# Patient Record
Sex: MALE | Race: WHITE | URBAN - NONMETROPOLITAN AREA
[De-identification: names, ages, dates, MRNs, and addresses within clinical notes are randomized per-mention and may not be internally consistent; named-entity substitution may affect disease eponyms.]

---
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
## 2025-01-01 ENCOUNTER — APPOINTMENT (OUTPATIENT)
Dept: RADIOLOGY | Facility: MEDICAL CENTER | Age: 20
DRG: 870 | End: 2025-01-01
Attending: INTERNAL MEDICINE
Payer: MEDICAID

## 2025-01-01 ENCOUNTER — APPOINTMENT (OUTPATIENT)
Dept: RADIOLOGY | Facility: MEDICAL CENTER | Age: 20
End: 2025-01-01
Attending: INTERNAL MEDICINE
Payer: MEDICAID

## 2025-01-01 ENCOUNTER — HOSPITAL ENCOUNTER (INPATIENT)
Facility: MEDICAL CENTER | Age: 20
LOS: 7 days | End: 2025-03-31
Attending: INTERNAL MEDICINE | Admitting: INTERNAL MEDICINE
Payer: MEDICAID

## 2025-01-01 ENCOUNTER — APPOINTMENT (OUTPATIENT)
Dept: RADIOLOGY | Facility: MEDICAL CENTER | Age: 20
DRG: 870 | End: 2025-01-01
Attending: STUDENT IN AN ORGANIZED HEALTH CARE EDUCATION/TRAINING PROGRAM
Payer: MEDICAID

## 2025-01-01 ENCOUNTER — APPOINTMENT (OUTPATIENT)
Dept: CARDIOLOGY | Facility: MEDICAL CENTER | Age: 20
End: 2025-01-01
Attending: INTERNAL MEDICINE
Payer: MEDICAID

## 2025-01-01 VITALS
DIASTOLIC BLOOD PRESSURE: 19 MMHG | RESPIRATION RATE: 20 BRPM | HEIGHT: 62 IN | OXYGEN SATURATION: 14 % | BODY MASS INDEX: 48.56 KG/M2 | TEMPERATURE: 97 F | SYSTOLIC BLOOD PRESSURE: 42 MMHG | WEIGHT: 263.89 LBS

## 2025-01-01 DIAGNOSIS — N17.9 ACUTE RENAL FAILURE, UNSPECIFIED ACUTE RENAL FAILURE TYPE (HCC): ICD-10-CM

## 2025-01-01 LAB
ALBUMIN SERPL BCP-MCNC: 2.4 G/DL (ref 3.2–4.9)
ALBUMIN SERPL BCP-MCNC: 2.7 G/DL (ref 3.2–4.9)
ALBUMIN SERPL BCP-MCNC: 2.8 G/DL (ref 3.2–4.9)
ALBUMIN SERPL BCP-MCNC: 2.9 G/DL (ref 3.2–4.9)
ALBUMIN/GLOB SERPL: 0.5 G/DL
ALBUMIN/GLOB SERPL: 0.6 G/DL
ALBUMIN/GLOB SERPL: 0.7 G/DL
ALBUMIN/GLOB SERPL: 0.7 G/DL
ALP SERPL-CCNC: 56 U/L (ref 30–99)
ALP SERPL-CCNC: 57 U/L (ref 30–99)
ALP SERPL-CCNC: 60 U/L (ref 30–99)
ALP SERPL-CCNC: 62 U/L (ref 30–99)
ALP SERPL-CCNC: 64 U/L (ref 30–99)
ALP SERPL-CCNC: 66 U/L (ref 30–99)
ALP SERPL-CCNC: 83 U/L (ref 30–99)
ALP SERPL-CCNC: 86 U/L (ref 30–99)
ALT SERPL-CCNC: 28 U/L (ref 2–50)
ALT SERPL-CCNC: 28 U/L (ref 2–50)
ALT SERPL-CCNC: 30 U/L (ref 2–50)
ALT SERPL-CCNC: 30 U/L (ref 2–50)
ALT SERPL-CCNC: 40 U/L (ref 2–50)
ALT SERPL-CCNC: 53 U/L (ref 2–50)
ALT SERPL-CCNC: 67 U/L (ref 2–50)
ALT SERPL-CCNC: 70 U/L (ref 2–50)
AMORPHOUS CRYSTALS 1764: PRESENT /HPF
ANION GAP SERPL CALC-SCNC: 10 MMOL/L (ref 7–16)
ANION GAP SERPL CALC-SCNC: 10 MMOL/L (ref 7–16)
ANION GAP SERPL CALC-SCNC: 11 MMOL/L (ref 7–16)
ANION GAP SERPL CALC-SCNC: 12 MMOL/L (ref 7–16)
ANION GAP SERPL CALC-SCNC: 15 MMOL/L (ref 7–16)
ANION GAP SERPL CALC-SCNC: 15 MMOL/L (ref 7–16)
ANION GAP SERPL CALC-SCNC: 16 MMOL/L (ref 7–16)
ANION GAP SERPL CALC-SCNC: 9 MMOL/L (ref 7–16)
APPEARANCE UR: ABNORMAL
ARTERIAL PATENCY WRIST A: ABNORMAL
AST SERPL-CCNC: 35 U/L (ref 12–45)
AST SERPL-CCNC: 38 U/L (ref 12–45)
AST SERPL-CCNC: 38 U/L (ref 12–45)
AST SERPL-CCNC: 41 U/L (ref 12–45)
AST SERPL-CCNC: 42 U/L (ref 12–45)
AST SERPL-CCNC: 44 U/L (ref 12–45)
AST SERPL-CCNC: 45 U/L (ref 12–45)
AST SERPL-CCNC: 51 U/L (ref 12–45)
B PARAP IS1001 DNA NPH QL NAA+NON-PROBE: NOT DETECTED
B PERT.PT PRMT NPH QL NAA+NON-PROBE: NOT DETECTED
BACTERIA #/AREA URNS HPF: ABNORMAL /HPF
BACTERIA BLD CULT: NORMAL
BACTERIA SPEC RESP CULT: NORMAL
BASE EXCESS BLDA CALC-SCNC: -1 MMOL/L (ref -4–3)
BASE EXCESS BLDA CALC-SCNC: -3 MMOL/L (ref -4–3)
BASE EXCESS BLDA CALC-SCNC: -4 MMOL/L (ref -4–3)
BASE EXCESS BLDA CALC-SCNC: -6 MMOL/L (ref -4–3)
BASE EXCESS BLDA CALC-SCNC: 0 MMOL/L (ref -4–3)
BASE EXCESS BLDA CALC-SCNC: 0 MMOL/L (ref -4–3)
BASE EXCESS BLDA CALC-SCNC: 1 MMOL/L (ref -4–3)
BASE EXCESS BLDA CALC-SCNC: 2 MMOL/L (ref -4–3)
BASE EXCESS BLDA CALC-SCNC: 2 MMOL/L (ref -4–3)
BASE EXCESS BLDV CALC-SCNC: -1 MMOL/L (ref -2–3)
BASE EXCESS BLDV CALC-SCNC: -2 MMOL/L (ref -2–3)
BASE EXCESS BLDV CALC-SCNC: 0 MMOL/L (ref -2–3)
BASOPHILS # BLD AUTO: 0.1 % (ref 0–1.8)
BASOPHILS # BLD: 0.01 K/UL (ref 0–0.12)
BILIRUB SERPL-MCNC: 0.3 MG/DL (ref 0.1–1.5)
BILIRUB SERPL-MCNC: 0.3 MG/DL (ref 0.1–1.5)
BILIRUB SERPL-MCNC: 0.4 MG/DL (ref 0.1–1.5)
BILIRUB SERPL-MCNC: 0.5 MG/DL (ref 0.1–1.5)
BILIRUB SERPL-MCNC: 0.6 MG/DL (ref 0.1–1.5)
BILIRUB SERPL-MCNC: 0.6 MG/DL (ref 0.1–1.5)
BILIRUB SERPL-MCNC: 0.7 MG/DL (ref 0.1–1.5)
BILIRUB SERPL-MCNC: 0.8 MG/DL (ref 0.1–1.5)
BILIRUB UR QL STRIP.AUTO: ABNORMAL
BODY TEMPERATURE: ABNORMAL DEGREES
BODY TEMPERATURE: NORMAL DEGREES
BODY TEMPERATURE: NORMAL DEGREES
BREATHS SETTING VENT: 20
BREATHS SETTING VENT: 24
BREATHS SETTING VENT: 28
BUN SERPL-MCNC: 18 MG/DL (ref 8–22)
BUN SERPL-MCNC: 20 MG/DL (ref 8–22)
BUN SERPL-MCNC: 21 MG/DL (ref 8–22)
BUN SERPL-MCNC: 23 MG/DL (ref 8–22)
BUN SERPL-MCNC: 23 MG/DL (ref 8–22)
BUN SERPL-MCNC: 28 MG/DL (ref 8–22)
BUN SERPL-MCNC: 37 MG/DL (ref 8–22)
BUN SERPL-MCNC: 39 MG/DL (ref 8–22)
C PNEUM DNA NPH QL NAA+NON-PROBE: NOT DETECTED
CALCIUM ALBUM COR SERPL-MCNC: 10 MG/DL (ref 8.5–10.5)
CALCIUM ALBUM COR SERPL-MCNC: 10.1 MG/DL (ref 8.5–10.5)
CALCIUM ALBUM COR SERPL-MCNC: 9.5 MG/DL (ref 8.5–10.5)
CALCIUM ALBUM COR SERPL-MCNC: 9.6 MG/DL (ref 8.5–10.5)
CALCIUM ALBUM COR SERPL-MCNC: 9.7 MG/DL (ref 8.5–10.5)
CALCIUM ALBUM COR SERPL-MCNC: 9.7 MG/DL (ref 8.5–10.5)
CALCIUM SERPL-MCNC: 8.3 MG/DL (ref 8.5–10.5)
CALCIUM SERPL-MCNC: 8.4 MG/DL (ref 8.4–10.2)
CALCIUM SERPL-MCNC: 8.5 MG/DL (ref 8.4–10.2)
CALCIUM SERPL-MCNC: 8.7 MG/DL (ref 8.4–10.2)
CALCIUM SERPL-MCNC: 8.8 MG/DL (ref 8.5–10.5)
CALCIUM SERPL-MCNC: 9.1 MG/DL (ref 8.5–10.5)
CASTS URNS QL MICRO: ABNORMAL /LPF (ref 0–2)
CHLORIDE SERPL-SCNC: 104 MMOL/L (ref 96–112)
CHLORIDE SERPL-SCNC: 105 MMOL/L (ref 96–112)
CHLORIDE SERPL-SCNC: 96 MMOL/L (ref 96–112)
CHLORIDE SERPL-SCNC: 98 MMOL/L (ref 96–112)
CHLORIDE SERPL-SCNC: 99 MMOL/L (ref 96–112)
CO2 BLDA-SCNC: 23 MMOL/L (ref 32–48)
CO2 BLDA-SCNC: 23 MMOL/L (ref 32–48)
CO2 BLDA-SCNC: 25 MMOL/L (ref 32–48)
CO2 BLDA-SCNC: 25 MMOL/L (ref 32–48)
CO2 BLDA-SCNC: 26 MMOL/L (ref 32–48)
CO2 BLDA-SCNC: 27 MMOL/L (ref 32–48)
CO2 BLDA-SCNC: 28 MMOL/L (ref 32–48)
CO2 BLDA-SCNC: 29 MMOL/L (ref 32–48)
CO2 BLDA-SCNC: 29 MMOL/L (ref 32–48)
CO2 BLDV-SCNC: 25 MMOL/L (ref 20–33)
CO2 BLDV-SCNC: 25 MMOL/L (ref 20–33)
CO2 BLDV-SCNC: 28 MMOL/L (ref 20–33)
CO2 SERPL-SCNC: 18 MMOL/L (ref 20–33)
CO2 SERPL-SCNC: 20 MMOL/L (ref 20–33)
CO2 SERPL-SCNC: 22 MMOL/L (ref 20–33)
CO2 SERPL-SCNC: 23 MMOL/L (ref 20–33)
CO2 SERPL-SCNC: 25 MMOL/L (ref 20–33)
CO2 SERPL-SCNC: 27 MMOL/L (ref 20–33)
COLOR UR: ABNORMAL
CREAT SERPL-MCNC: 2.44 MG/DL (ref 0.5–1.4)
CREAT SERPL-MCNC: 4.32 MG/DL (ref 0.5–1.4)
CREAT SERPL-MCNC: 4.89 MG/DL (ref 0.5–1.4)
CREAT SERPL-MCNC: 5.5 MG/DL (ref 0.5–1.4)
CREAT SERPL-MCNC: 5.57 MG/DL (ref 0.5–1.4)
CREAT SERPL-MCNC: 5.64 MG/DL (ref 0.5–1.4)
CREAT SERPL-MCNC: 5.78 MG/DL (ref 0.5–1.4)
CREAT SERPL-MCNC: 5.86 MG/DL (ref 0.5–1.4)
CRP SERPL HS-MCNC: 38.3 MG/DL (ref 0–0.75)
DELSYS IDSYS: ABNORMAL
DELSYS IDSYS: NORMAL
DELSYS IDSYS: NORMAL
END TIDAL CARBON DIOXIDE IECO2: 32 MMHG
END TIDAL CARBON DIOXIDE IECO2: 37 MMHG
END TIDAL CARBON DIOXIDE IECO2: 38 MMHG
END TIDAL CARBON DIOXIDE IECO2: 38 MMHG
END TIDAL CARBON DIOXIDE IECO2: 40 MMHG
END TIDAL CARBON DIOXIDE IECO2: 41 MMHG
EOSINOPHIL # BLD AUTO: 0.05 K/UL (ref 0–0.51)
EOSINOPHIL NFR BLD: 0.3 % (ref 0–6.9)
EPITHELIAL CELLS 1715: ABNORMAL /HPF (ref 0–5)
ERYTHROCYTE [DISTWIDTH] IN BLOOD BY AUTOMATED COUNT: 49.4 FL (ref 35.9–50)
ERYTHROCYTE [DISTWIDTH] IN BLOOD BY AUTOMATED COUNT: 50.7 FL (ref 35.9–50)
ERYTHROCYTE [DISTWIDTH] IN BLOOD BY AUTOMATED COUNT: 51.2 FL (ref 35.9–50)
ERYTHROCYTE [DISTWIDTH] IN BLOOD BY AUTOMATED COUNT: 51.3 FL (ref 35.9–50)
ERYTHROCYTE [DISTWIDTH] IN BLOOD BY AUTOMATED COUNT: 53.2 FL (ref 35.9–50)
ERYTHROCYTE [DISTWIDTH] IN BLOOD BY AUTOMATED COUNT: 53.3 FL (ref 35.9–50)
ERYTHROCYTE [DISTWIDTH] IN BLOOD BY AUTOMATED COUNT: 53.5 FL (ref 35.9–50)
ERYTHROCYTE [DISTWIDTH] IN BLOOD BY AUTOMATED COUNT: 55.4 FL (ref 35.9–50)
FLUAV RNA NPH QL NAA+NON-PROBE: NOT DETECTED
FLUBV RNA NPH QL NAA+NON-PROBE: NOT DETECTED
FOLATE SERPL-MCNC: 23.8 NG/ML
FUNGUS SPEC CULT: NORMAL
FUNGUS SPEC FUNGUS STN: NORMAL
FUNGUS SPEC FUNGUS STN: NORMAL
GFR SERPLBLD CREATININE-BSD FMLA CKD-EPI: 13 ML/MIN/1.73 M 2
GFR SERPLBLD CREATININE-BSD FMLA CKD-EPI: 14 ML/MIN/1.73 M 2
GFR SERPLBLD CREATININE-BSD FMLA CKD-EPI: 17 ML/MIN/1.73 M 2
GFR SERPLBLD CREATININE-BSD FMLA CKD-EPI: 19 ML/MIN/1.73 M 2
GFR SERPLBLD CREATININE-BSD FMLA CKD-EPI: 38 ML/MIN/1.73 M 2
GLOBULIN SER CALC-MCNC: 3.8 G/DL (ref 1.9–3.5)
GLOBULIN SER CALC-MCNC: 3.9 G/DL (ref 1.9–3.5)
GLOBULIN SER CALC-MCNC: 4.8 G/DL (ref 1.9–3.5)
GLOBULIN SER CALC-MCNC: 5.1 G/DL (ref 1.9–3.5)
GLOBULIN SER CALC-MCNC: 5.6 G/DL (ref 1.9–3.5)
GLUCOSE BLD STRIP.AUTO-MCNC: 105 MG/DL (ref 65–99)
GLUCOSE SERPL-MCNC: 100 MG/DL (ref 65–99)
GLUCOSE SERPL-MCNC: 115 MG/DL (ref 65–99)
GLUCOSE SERPL-MCNC: 119 MG/DL (ref 65–99)
GLUCOSE SERPL-MCNC: 77 MG/DL (ref 65–99)
GLUCOSE SERPL-MCNC: 82 MG/DL (ref 65–99)
GLUCOSE SERPL-MCNC: 84 MG/DL (ref 65–99)
GLUCOSE SERPL-MCNC: 90 MG/DL (ref 65–99)
GLUCOSE SERPL-MCNC: 93 MG/DL (ref 65–99)
GLUCOSE UR STRIP.AUTO-MCNC: 100 MG/DL
GRAM STN SPEC: NORMAL
GRAM STN SPEC: NORMAL
HADV DNA NPH QL NAA+NON-PROBE: NOT DETECTED
HAV IGM SERPL QL IA: NORMAL
HBV CORE IGM SER QL: NORMAL
HBV SURFACE AB SERPL IA-ACNC: <3.5 MIU/ML (ref 0–10)
HBV SURFACE AG SER QL: NORMAL
HCO3 BLDA-SCNC: 21.2 MMOL/L (ref 21–28)
HCO3 BLDA-SCNC: 22 MMOL/L (ref 21–28)
HCO3 BLDA-SCNC: 23.9 MMOL/L (ref 21–28)
HCO3 BLDA-SCNC: 23.9 MMOL/L (ref 21–28)
HCO3 BLDA-SCNC: 24.6 MMOL/L (ref 21–28)
HCO3 BLDA-SCNC: 25.8 MMOL/L (ref 21–28)
HCO3 BLDA-SCNC: 26.7 MMOL/L (ref 21–28)
HCO3 BLDA-SCNC: 27.6 MMOL/L (ref 21–28)
HCO3 BLDA-SCNC: 27.7 MMOL/L (ref 21–28)
HCO3 BLDV-SCNC: 23.5 MMOL/L (ref 22–29)
HCO3 BLDV-SCNC: 23.6 MMOL/L (ref 22–29)
HCO3 BLDV-SCNC: 26.1 MMOL/L (ref 22–29)
HCOV 229E RNA NPH QL NAA+NON-PROBE: NOT DETECTED
HCOV HKU1 RNA NPH QL NAA+NON-PROBE: NOT DETECTED
HCOV NL63 RNA NPH QL NAA+NON-PROBE: NOT DETECTED
HCOV OC43 RNA NPH QL NAA+NON-PROBE: NOT DETECTED
HCT VFR BLD AUTO: 27.4 % (ref 42–52)
HCT VFR BLD AUTO: 28.6 % (ref 42–52)
HCT VFR BLD AUTO: 29.1 % (ref 42–52)
HCT VFR BLD AUTO: 29.2 % (ref 42–52)
HCT VFR BLD AUTO: 29.5 % (ref 42–52)
HCT VFR BLD AUTO: 30.1 % (ref 42–52)
HCT VFR BLD AUTO: 33.8 % (ref 42–52)
HCT VFR BLD AUTO: 35 % (ref 42–52)
HCV AB SER QL: NORMAL
HGB BLD-MCNC: 10 G/DL (ref 14–18)
HGB BLD-MCNC: 10.3 G/DL (ref 14–18)
HGB BLD-MCNC: 11.1 G/DL (ref 14–18)
HGB BLD-MCNC: 11.3 G/DL (ref 14–18)
HGB BLD-MCNC: 9.3 G/DL (ref 14–18)
HGB BLD-MCNC: 9.5 G/DL (ref 14–18)
HGB BLD-MCNC: 9.5 G/DL (ref 14–18)
HGB BLD-MCNC: 9.9 G/DL (ref 14–18)
HMPV RNA NPH QL NAA+NON-PROBE: NOT DETECTED
HOROWITZ INDEX BLDA+IHG-RTO: 124 MM[HG]
HOROWITZ INDEX BLDA+IHG-RTO: 127 MM[HG]
HOROWITZ INDEX BLDA+IHG-RTO: 57 MM[HG]
HOROWITZ INDEX BLDA+IHG-RTO: 64 MM[HG]
HOROWITZ INDEX BLDA+IHG-RTO: 74 MM[HG]
HOROWITZ INDEX BLDA+IHG-RTO: 75 MM[HG]
HOROWITZ INDEX BLDA+IHG-RTO: 79 MM[HG]
HOROWITZ INDEX BLDA+IHG-RTO: 80 MM[HG]
HOROWITZ INDEX BLDA+IHG-RTO: 87 MM[HG]
HOROWITZ INDEX BLDV+IHG-RTO: 51 MM[HG]
HOROWITZ INDEX BLDV+IHG-RTO: 62 MM[HG]
HOROWITZ INDEX BLDV+IHG-RTO: 64 MM[HG]
HPIV1 RNA NPH QL NAA+NON-PROBE: NOT DETECTED
HPIV2 RNA NPH QL NAA+NON-PROBE: NOT DETECTED
HPIV3 RNA NPH QL NAA+NON-PROBE: DETECTED
HPIV4 RNA NPH QL NAA+NON-PROBE: NOT DETECTED
HYALINE CAST   1831: PRESENT /LPF
IMM GRANULOCYTES # BLD AUTO: 0.21 K/UL (ref 0–0.11)
IMM GRANULOCYTES NFR BLD AUTO: 1.4 % (ref 0–0.9)
KETONES UR STRIP.AUTO-MCNC: ABNORMAL MG/DL
LACTATE BLD-SCNC: 0.8 MMOL/L (ref 0.5–2)
LACTATE BLD-SCNC: 0.9 MMOL/L (ref 0.5–2)
LACTATE BLD-SCNC: 1 MMOL/L (ref 0.5–2)
LACTATE BLD-SCNC: 1.3 MMOL/L (ref 0.5–2)
LACTATE BLD-SCNC: 1.4 MMOL/L (ref 0.5–2)
LACTATE BLD-SCNC: 1.5 MMOL/L (ref 0.5–2)
LACTATE BLD-SCNC: 1.5 MMOL/L (ref 0.5–2)
LACTATE BLD-SCNC: 1.6 MMOL/L (ref 0.5–2)
LACTATE BLD-SCNC: 1.9 MMOL/L (ref 0.5–2)
LACTATE BLD-SCNC: 2 MMOL/L (ref 0.5–2)
LACTATE SERPL-SCNC: 1.2 MMOL/L (ref 0.5–2)
LACTATE SERPL-SCNC: 1.9 MMOL/L (ref 0.5–2)
LEUKOCYTE ESTERASE UR QL STRIP.AUTO: ABNORMAL
LV EJECT FRACT MOD 2C 99903: 70.43
LV EJECT FRACT MOD 4C 99902: 67.92
LV EJECT FRACT MOD BP 99901: 68.13
LYMPHOCYTES # BLD AUTO: 0.97 K/UL (ref 1–4.8)
LYMPHOCYTES NFR BLD: 6.7 % (ref 22–41)
M PNEUMO DNA NPH QL NAA+NON-PROBE: DETECTED
MAGNESIUM SERPL-MCNC: 1.6 MG/DL (ref 1.5–2.5)
MAGNESIUM SERPL-MCNC: 1.8 MG/DL (ref 1.5–2.5)
MAGNESIUM SERPL-MCNC: 1.9 MG/DL (ref 1.5–2.5)
MAGNESIUM SERPL-MCNC: 2 MG/DL (ref 1.5–2.5)
MAGNESIUM SERPL-MCNC: 2 MG/DL (ref 1.5–2.5)
MCH RBC QN AUTO: 33.7 PG (ref 27–33)
MCH RBC QN AUTO: 34 PG (ref 27–33)
MCH RBC QN AUTO: 34.4 PG (ref 27–33)
MCH RBC QN AUTO: 34.6 PG (ref 27–33)
MCH RBC QN AUTO: 35.1 PG (ref 27–33)
MCH RBC QN AUTO: 35.5 PG (ref 27–33)
MCH RBC QN AUTO: 36.1 PG (ref 27–33)
MCH RBC QN AUTO: 37.1 PG (ref 27–33)
MCHC RBC AUTO-ENTMCNC: 32.3 G/DL (ref 32.3–36.5)
MCHC RBC AUTO-ENTMCNC: 32.5 G/DL (ref 32.3–36.5)
MCHC RBC AUTO-ENTMCNC: 32.8 G/DL (ref 32.3–36.5)
MCHC RBC AUTO-ENTMCNC: 33.2 G/DL (ref 32.3–36.5)
MCHC RBC AUTO-ENTMCNC: 33.2 G/DL (ref 32.3–36.5)
MCHC RBC AUTO-ENTMCNC: 33.9 G/DL (ref 32.3–36.5)
MCHC RBC AUTO-ENTMCNC: 34 G/DL (ref 32.3–36.5)
MCHC RBC AUTO-ENTMCNC: 34.9 G/DL (ref 32.3–36.5)
MCV RBC AUTO: 101.7 FL (ref 81.4–97.8)
MCV RBC AUTO: 103.5 FL (ref 81.4–97.8)
MCV RBC AUTO: 103.7 FL (ref 81.4–97.8)
MCV RBC AUTO: 104.5 FL (ref 81.4–97.8)
MCV RBC AUTO: 105.5 FL (ref 81.4–97.8)
MCV RBC AUTO: 105.8 FL (ref 81.4–97.8)
MCV RBC AUTO: 106.7 FL (ref 81.4–97.8)
MCV RBC AUTO: 109.2 FL (ref 81.4–97.8)
MICRO URNS: ABNORMAL
MODE IMODE: ABNORMAL
MODE IMODE: NORMAL
MODE IMODE: NORMAL
MONOCYTES # BLD AUTO: 0.51 K/UL (ref 0–0.85)
MONOCYTES NFR BLD AUTO: 3.5 % (ref 0–13.4)
MUCOUS THREADS URNS QL MICRO: PRESENT /HPF
NEUTROPHILS # BLD AUTO: 12.74 K/UL (ref 1.82–7.42)
NEUTROPHILS NFR BLD: 88 % (ref 44–72)
NITRITE UR QL STRIP.AUTO: NEGATIVE
NRBC # BLD AUTO: 0 K/UL
NRBC BLD-RTO: 0 /100 WBC (ref 0–0.2)
NT-PROBNP SERPL IA-MCNC: 122 PG/ML (ref 0–125)
O2/TOTAL GAS SETTING VFR VENT: 100 %
O2/TOTAL GAS SETTING VFR VENT: 55 %
O2/TOTAL GAS SETTING VFR VENT: 60 %
O2/TOTAL GAS SETTING VFR VENT: 60 %
O2/TOTAL GAS SETTING VFR VENT: 70 %
O2/TOTAL GAS SETTING VFR VENT: 70 %
O2/TOTAL GAS SETTING VFR VENT: 80 %
O2/TOTAL GAS SETTING VFR VENT: 80 %
O2/TOTAL GAS SETTING VFR VENT: 90 %
O2/TOTAL GAS SETTING VFR VENT: 90 %
PCO2 BLDA: 37.7 MMHG (ref 32–48)
PCO2 BLDA: 38.4 MMHG (ref 32–48)
PCO2 BLDA: 38.8 MMHG (ref 32–48)
PCO2 BLDA: 44.2 MMHG (ref 32–48)
PCO2 BLDA: 45.1 MMHG (ref 32–48)
PCO2 BLDA: 48.6 MMHG (ref 32–48)
PCO2 BLDA: 49.6 MMHG (ref 32–48)
PCO2 BLDA: 50.3 MMHG (ref 32–48)
PCO2 BLDA: 53.2 MMHG (ref 32–48)
PCO2 BLDV: 37.6 MMHG (ref 38–54)
PCO2 BLDV: 42.5 MMHG (ref 38–54)
PCO2 BLDV: 46.7 MMHG (ref 38–54)
PCO2 TEMP ADJ BLDV: 38.9 MMHG (ref 38–54)
PCO2 TEMP ADJ BLDV: 43.8 MMHG (ref 38–54)
PCO2 TEMP ADJ BLDV: 49.2 MMHG (ref 38–54)
PEEP END EXPIRATORY PRESSURE IPEEP: 10 CMH20
PEEP END EXPIRATORY PRESSURE IPEEP: 12 CMH20
PEEP END EXPIRATORY PRESSURE IPEEP: 12 CMH20
PEEP END EXPIRATORY PRESSURE IPEEP: 14 CMH20
PEEP END EXPIRATORY PRESSURE IPEEP: 16 CMH20
PEEP END EXPIRATORY PRESSURE IPEEP: 16 CMH20
PH BLDA: 7.24 [PH] (ref 7.35–7.45)
PH BLDA: 7.26 [PH] (ref 7.35–7.45)
PH BLDA: 7.35 [PH] (ref 7.35–7.45)
PH BLDA: 7.36 [PH] (ref 7.35–7.45)
PH BLDA: 7.37 [PH] (ref 7.35–7.45)
PH BLDA: 7.38 [PH] (ref 7.35–7.45)
PH BLDA: 7.39 [PH] (ref 7.35–7.45)
PH BLDA: 7.4 [PH] (ref 7.35–7.45)
PH BLDA: 7.41 [PH] (ref 7.35–7.45)
PH BLDV: 7.35 [PH] (ref 7.31–7.45)
PH BLDV: 7.36 [PH] (ref 7.31–7.45)
PH BLDV: 7.41 [PH] (ref 7.31–7.45)
PH TEMP ADJ BLDA: 7.22 [PH] (ref 7.35–7.45)
PH TEMP ADJ BLDA: 7.23 [PH] (ref 7.35–7.45)
PH TEMP ADJ BLDA: 7.35 [PH] (ref 7.35–7.45)
PH TEMP ADJ BLDA: 7.35 [PH] (ref 7.35–7.45)
PH TEMP ADJ BLDA: 7.37 [PH] (ref 7.35–7.45)
PH TEMP ADJ BLDA: 7.38 [PH] (ref 7.35–7.45)
PH TEMP ADJ BLDA: 7.38 [PH] (ref 7.35–7.45)
PH TEMP ADJ BLDA: 7.39 [PH] (ref 7.35–7.45)
PH TEMP ADJ BLDA: 7.39 [PH] (ref 7.35–7.45)
PH TEMP ADJ BLDV: 7.34 [PH] (ref 7.31–7.45)
PH TEMP ADJ BLDV: 7.34 [PH] (ref 7.31–7.45)
PH TEMP ADJ BLDV: 7.39 [PH] (ref 7.31–7.45)
PH UR STRIP.AUTO: 5 [PH] (ref 5–8)
PHOSPHATE SERPL-MCNC: 3.1 MG/DL (ref 2.5–4.5)
PHOSPHATE SERPL-MCNC: 3.4 MG/DL (ref 2.5–4.5)
PHOSPHATE SERPL-MCNC: 3.5 MG/DL (ref 2.5–4.5)
PHOSPHATE SERPL-MCNC: 4.3 MG/DL (ref 2.5–4.5)
PHOSPHATE SERPL-MCNC: 4.8 MG/DL (ref 2.5–4.5)
PHOSPHATE SERPL-MCNC: 5 MG/DL (ref 2.5–4.5)
PHOSPHATE SERPL-MCNC: 5.3 MG/DL (ref 2.5–4.5)
PHOSPHATE SERPL-MCNC: 5.3 MG/DL (ref 2.5–4.5)
PLATELET # BLD AUTO: 319 K/UL (ref 164–446)
PLATELET # BLD AUTO: 334 K/UL (ref 164–446)
PLATELET # BLD AUTO: 338 K/UL (ref 164–446)
PLATELET # BLD AUTO: 363 K/UL (ref 164–446)
PLATELET # BLD AUTO: 365 K/UL (ref 164–446)
PLATELET # BLD AUTO: 366 K/UL (ref 164–446)
PLATELET # BLD AUTO: 367 K/UL (ref 164–446)
PLATELET # BLD AUTO: 387 K/UL (ref 164–446)
PMV BLD AUTO: 10 FL (ref 9–12.9)
PMV BLD AUTO: 10 FL (ref 9–12.9)
PMV BLD AUTO: 9.6 FL (ref 9–12.9)
PMV BLD AUTO: 9.7 FL (ref 9–12.9)
PMV BLD AUTO: 9.8 FL (ref 9–12.9)
PMV BLD AUTO: 9.9 FL (ref 9–12.9)
PO2 BLDA: 57 MMHG (ref 83–108)
PO2 BLDA: 58 MMHG (ref 83–108)
PO2 BLDA: 59 MMHG (ref 83–108)
PO2 BLDA: 61 MMHG (ref 83–108)
PO2 BLDA: 68 MMHG (ref 83–108)
PO2 BLDA: 72 MMHG (ref 83–108)
PO2 BLDA: 75 MMHG (ref 83–108)
PO2 BLDA: 76 MMHG (ref 83–108)
PO2 BLDA: 79 MMHG (ref 83–108)
PO2 BLDV: 36 MMHG (ref 23–48)
PO2 BLDV: 37 MMHG (ref 23–48)
PO2 BLDV: 51 MMHG (ref 23–48)
PO2 TEMP ADJ BLDA: 53 MMHG (ref 83–108)
PO2 TEMP ADJ BLDA: 58 MMHG (ref 83–108)
PO2 TEMP ADJ BLDA: 63 MMHG (ref 83–108)
PO2 TEMP ADJ BLDA: 64 MMHG (ref 83–108)
PO2 TEMP ADJ BLDA: 71 MMHG (ref 83–108)
PO2 TEMP ADJ BLDA: 77 MMHG (ref 83–108)
PO2 TEMP ADJ BLDA: 80 MMHG (ref 83–108)
PO2 TEMP ADJ BLDA: 82 MMHG (ref 83–108)
PO2 TEMP ADJ BLDA: 86 MMHG (ref 83–108)
PO2 TEMP ADJ BLDV: 39 MMHG
PO2 TEMP ADJ BLDV: 39 MMHG
PO2 TEMP ADJ BLDV: 54 MMHG
POTASSIUM SERPL-SCNC: 3.5 MMOL/L (ref 3.6–5.5)
POTASSIUM SERPL-SCNC: 3.8 MMOL/L (ref 3.6–5.5)
POTASSIUM SERPL-SCNC: 4 MMOL/L (ref 3.6–5.5)
POTASSIUM SERPL-SCNC: 4.1 MMOL/L (ref 3.6–5.5)
POTASSIUM SERPL-SCNC: 4.4 MMOL/L (ref 3.6–5.5)
POTASSIUM SERPL-SCNC: 4.6 MMOL/L (ref 3.6–5.5)
PREALB SERPL-MCNC: <3 MG/DL (ref 18–38)
PRELIMINARY RPT Q0601: NORMAL
PROT SERPL-MCNC: 6.2 G/DL (ref 6–8.2)
PROT SERPL-MCNC: 6.2 G/DL (ref 6–8.2)
PROT SERPL-MCNC: 6.3 G/DL (ref 6–8.2)
PROT SERPL-MCNC: 6.5 G/DL (ref 6–8.2)
PROT SERPL-MCNC: 6.6 G/DL (ref 6–8.2)
PROT SERPL-MCNC: 7.2 G/DL (ref 6–8.2)
PROT SERPL-MCNC: 7.5 G/DL (ref 6–8.2)
PROT SERPL-MCNC: 8.5 G/DL (ref 6–8.2)
PROT UR QL STRIP: 30 MG/DL
RBC # BLD AUTO: 2.51 M/UL (ref 4.7–6.1)
RBC # BLD AUTO: 2.71 M/UL (ref 4.7–6.1)
RBC # BLD AUTO: 2.76 M/UL (ref 4.7–6.1)
RBC # BLD AUTO: 2.82 M/UL (ref 4.7–6.1)
RBC # BLD AUTO: 2.85 M/UL (ref 4.7–6.1)
RBC # BLD AUTO: 2.86 M/UL (ref 4.7–6.1)
RBC # BLD AUTO: 3.26 M/UL (ref 4.7–6.1)
RBC # BLD AUTO: 3.35 M/UL (ref 4.7–6.1)
RBC # URNS HPF: ABNORMAL /HPF
RBC UR QL AUTO: ABNORMAL
RHODAMINE-AURAMINE STN SPEC: NORMAL
RSV RNA NPH QL NAA+NON-PROBE: NOT DETECTED
RV+EV RNA NPH QL NAA+NON-PROBE: NOT DETECTED
S PYO DNA SPEC NAA+PROBE: DETECTED
SAO2 % BLDA: 88 % (ref 93–99)
SAO2 % BLDA: 88 % (ref 93–99)
SAO2 % BLDA: 90 % (ref 93–99)
SAO2 % BLDA: 90 % (ref 93–99)
SAO2 % BLDA: 91 % (ref 93–99)
SAO2 % BLDA: 92 % (ref 93–99)
SAO2 % BLDA: 93 % (ref 93–99)
SAO2 % BLDA: 95 % (ref 93–99)
SAO2 % BLDA: 95 % (ref 93–99)
SAO2 % BLDV: 66 % (ref 60–85)
SAO2 % BLDV: 68 % (ref 60–85)
SAO2 % BLDV: 86 % (ref 60–85)
SARS-COV-2 RNA NPH QL NAA+NON-PROBE: NOTDETECTED
SCCMEC + MECA PNL NOSE NAA+PROBE: NEGATIVE
SIGNIFICANT IND 70042: NORMAL
SITE SITE: NORMAL
SODIUM SERPL-SCNC: 132 MMOL/L (ref 135–145)
SODIUM SERPL-SCNC: 133 MMOL/L (ref 135–145)
SODIUM SERPL-SCNC: 134 MMOL/L (ref 135–145)
SODIUM SERPL-SCNC: 134 MMOL/L (ref 135–145)
SODIUM SERPL-SCNC: 135 MMOL/L (ref 135–145)
SODIUM SERPL-SCNC: 135 MMOL/L (ref 135–145)
SODIUM SERPL-SCNC: 138 MMOL/L (ref 135–145)
SODIUM SERPL-SCNC: 138 MMOL/L (ref 135–145)
SOURCE SOURCE: NORMAL
SP GR UR STRIP.AUTO: 1.02
SPECIMEN DRAWN FROM PATIENT: ABNORMAL
SPECIMEN DRAWN FROM PATIENT: NORMAL
SPECIMEN DRAWN FROM PATIENT: NORMAL
TIDAL VOLUME IVT: 330 ML
TIDAL VOLUME IVT: 400 ML
TRIGL SERPL-MCNC: 108 MG/DL (ref 0–149)
TRIGL SERPL-MCNC: 170 MG/DL (ref 0–149)
TRIGL SERPL-MCNC: 171 MG/DL (ref 0–149)
TRIGL SERPL-MCNC: 239 MG/DL (ref 0–149)
TRIGL SERPL-MCNC: 265 MG/DL (ref 0–149)
UROBILINOGEN UR STRIP.AUTO-MCNC: 1 EU/DL
VIT B12 SERPL-MCNC: 468 PG/ML (ref 211–911)
WBC # BLD AUTO: 12.7 K/UL (ref 4.8–10.8)
WBC # BLD AUTO: 14.5 K/UL (ref 4.8–10.8)
WBC # BLD AUTO: 14.8 K/UL (ref 4.8–10.8)
WBC # BLD AUTO: 15.3 K/UL (ref 4.8–10.8)
WBC # BLD AUTO: 15.4 K/UL (ref 4.8–10.8)
WBC # BLD AUTO: 15.8 K/UL (ref 4.8–10.8)
WBC # BLD AUTO: 16.6 K/UL (ref 4.8–10.8)
WBC # BLD AUTO: 20.6 K/UL (ref 4.8–10.8)
WBC #/AREA URNS HPF: ABNORMAL /HPF

## 2025-01-01 PROCEDURE — 700111 HCHG RX REV CODE 636 W/ 250 OVERRIDE (IP): Performed by: INTERNAL MEDICINE

## 2025-01-01 PROCEDURE — 31624 DX BRONCHOSCOPE/LAVAGE: CPT | Performed by: INTERNAL MEDICINE

## 2025-01-01 PROCEDURE — 99291 CRITICAL CARE FIRST HOUR: CPT | Mod: 25 | Performed by: INTERNAL MEDICINE

## 2025-01-01 PROCEDURE — 71045 X-RAY EXAM CHEST 1 VIEW: CPT

## 2025-01-01 PROCEDURE — 93306 TTE W/DOPPLER COMPLETE: CPT

## 2025-01-01 PROCEDURE — 80053 COMPREHEN METABOLIC PANEL: CPT

## 2025-01-01 PROCEDURE — 94669 MECHANICAL CHEST WALL OSCILL: CPT

## 2025-01-01 PROCEDURE — 94003 VENT MGMT INPAT SUBQ DAY: CPT

## 2025-01-01 PROCEDURE — 700105 HCHG RX REV CODE 258: Performed by: INTERNAL MEDICINE

## 2025-01-01 PROCEDURE — 99232 SBSQ HOSP IP/OBS MODERATE 35: CPT | Performed by: INTERNAL MEDICINE

## 2025-01-01 PROCEDURE — 31645 BRNCHSC W/THER ASPIR 1ST: CPT | Performed by: INTERNAL MEDICINE

## 2025-01-01 PROCEDURE — A9270 NON-COVERED ITEM OR SERVICE: HCPCS | Performed by: INTERNAL MEDICINE

## 2025-01-01 PROCEDURE — 82803 BLOOD GASES ANY COMBINATION: CPT

## 2025-01-01 PROCEDURE — 87641 MR-STAPH DNA AMP PROBE: CPT

## 2025-01-01 PROCEDURE — 700101 HCHG RX REV CODE 250: Performed by: INTERNAL MEDICINE

## 2025-01-01 PROCEDURE — 83735 ASSAY OF MAGNESIUM: CPT

## 2025-01-01 PROCEDURE — 83605 ASSAY OF LACTIC ACID: CPT

## 2025-01-01 PROCEDURE — 94799 UNLISTED PULMONARY SVC/PX: CPT

## 2025-01-01 PROCEDURE — 94760 N-INVAS EAR/PLS OXIMETRY 1: CPT

## 2025-01-01 PROCEDURE — 99292 CRITICAL CARE ADDL 30 MIN: CPT | Performed by: STUDENT IN AN ORGANIZED HEALTH CARE EDUCATION/TRAINING PROGRAM

## 2025-01-01 PROCEDURE — 0BJ08ZZ INSPECTION OF TRACHEOBRONCHIAL TREE, VIA NATURAL OR ARTIFICIAL OPENING ENDOSCOPIC: ICD-10-PCS | Performed by: INTERNAL MEDICINE

## 2025-01-01 PROCEDURE — 84478 ASSAY OF TRIGLYCERIDES: CPT

## 2025-01-01 PROCEDURE — 700102 HCHG RX REV CODE 250 W/ 637 OVERRIDE(OP): Performed by: INTERNAL MEDICINE

## 2025-01-01 PROCEDURE — 83605 ASSAY OF LACTIC ACID: CPT | Mod: 91

## 2025-01-01 PROCEDURE — 36600 WITHDRAWAL OF ARTERIAL BLOOD: CPT

## 2025-01-01 PROCEDURE — 700111 HCHG RX REV CODE 636 W/ 250 OVERRIDE (IP): Mod: JZ | Performed by: INTERNAL MEDICINE

## 2025-01-01 PROCEDURE — 99255 IP/OBS CONSLTJ NEW/EST HI 80: CPT | Performed by: INTERNAL MEDICINE

## 2025-01-01 PROCEDURE — 82746 ASSAY OF FOLIC ACID SERUM: CPT

## 2025-01-01 PROCEDURE — 700111 HCHG RX REV CODE 636 W/ 250 OVERRIDE (IP)

## 2025-01-01 PROCEDURE — 99291 CRITICAL CARE FIRST HOUR: CPT | Performed by: INTERNAL MEDICINE

## 2025-01-01 PROCEDURE — 99292 CRITICAL CARE ADDL 30 MIN: CPT | Performed by: INTERNAL MEDICINE

## 2025-01-01 PROCEDURE — 97602 WOUND(S) CARE NON-SELECTIVE: CPT

## 2025-01-01 PROCEDURE — 0202U NFCT DS 22 TRGT SARS-COV-2: CPT

## 2025-01-01 PROCEDURE — 85027 COMPLETE CBC AUTOMATED: CPT

## 2025-01-01 PROCEDURE — 700111 HCHG RX REV CODE 636 W/ 250 OVERRIDE (IP): Performed by: STUDENT IN AN ORGANIZED HEALTH CARE EDUCATION/TRAINING PROGRAM

## 2025-01-01 PROCEDURE — 94640 AIRWAY INHALATION TREATMENT: CPT

## 2025-01-01 PROCEDURE — 302977 HCHG BRONCHOSCOPY PROC-THERAPEUTIC

## 2025-01-01 PROCEDURE — 87040 BLOOD CULTURE FOR BACTERIA: CPT

## 2025-01-01 PROCEDURE — 99233 SBSQ HOSP IP/OBS HIGH 50: CPT | Performed by: INTERNAL MEDICINE

## 2025-01-01 PROCEDURE — 02HV33Z INSERTION OF INFUSION DEVICE INTO SUPERIOR VENA CAVA, PERCUTANEOUS APPROACH: ICD-10-PCS | Performed by: INTERNAL MEDICINE

## 2025-01-01 PROCEDURE — 770022 HCHG ROOM/CARE - ICU (200)

## 2025-01-01 PROCEDURE — 87205 SMEAR GRAM STAIN: CPT | Mod: 91

## 2025-01-01 PROCEDURE — 84134 ASSAY OF PREALBUMIN: CPT

## 2025-01-01 PROCEDURE — 80074 ACUTE HEPATITIS PANEL: CPT

## 2025-01-01 PROCEDURE — 82607 VITAMIN B-12: CPT

## 2025-01-01 PROCEDURE — 36556 INSERT NON-TUNNEL CV CATH: CPT | Performed by: INTERNAL MEDICINE

## 2025-01-01 PROCEDURE — 82962 GLUCOSE BLOOD TEST: CPT

## 2025-01-01 PROCEDURE — 84100 ASSAY OF PHOSPHORUS: CPT

## 2025-01-01 PROCEDURE — 90935 HEMODIALYSIS ONE EVALUATION: CPT

## 2025-01-01 PROCEDURE — 36415 COLL VENOUS BLD VENIPUNCTURE: CPT

## 2025-01-01 PROCEDURE — 74176 CT ABD & PELVIS W/O CONTRAST: CPT

## 2025-01-01 PROCEDURE — 70491 CT SOFT TISSUE NECK W/DYE: CPT

## 2025-01-01 PROCEDURE — 0B9J8ZX DRAINAGE OF LEFT LOWER LUNG LOBE, VIA NATURAL OR ARTIFICIAL OPENING ENDOSCOPIC, DIAGNOSTIC: ICD-10-PCS | Performed by: INTERNAL MEDICINE

## 2025-01-01 PROCEDURE — 87102 FUNGUS ISOLATION CULTURE: CPT

## 2025-01-01 PROCEDURE — 81001 URINALYSIS AUTO W/SCOPE: CPT

## 2025-01-01 PROCEDURE — 93306 TTE W/DOPPLER COMPLETE: CPT | Mod: 26 | Performed by: INTERNAL MEDICINE

## 2025-01-01 PROCEDURE — 82803 BLOOD GASES ANY COMBINATION: CPT | Mod: 91

## 2025-01-01 PROCEDURE — 700111 HCHG RX REV CODE 636 W/ 250 OVERRIDE (IP): Mod: JZ | Performed by: STUDENT IN AN ORGANIZED HEALTH CARE EDUCATION/TRAINING PROGRAM

## 2025-01-01 PROCEDURE — 700117 HCHG RX CONTRAST REV CODE 255: Performed by: INTERNAL MEDICINE

## 2025-01-01 PROCEDURE — 86140 C-REACTIVE PROTEIN: CPT

## 2025-01-01 PROCEDURE — 99292 CRITICAL CARE ADDL 30 MIN: CPT | Mod: 25 | Performed by: STUDENT IN AN ORGANIZED HEALTH CARE EDUCATION/TRAINING PROGRAM

## 2025-01-01 PROCEDURE — 302978 HCHG BRONCHOSCOPY-DIAGNOSTIC

## 2025-01-01 PROCEDURE — 36556 INSERT NON-TUNNEL CV CATH: CPT

## 2025-01-01 PROCEDURE — 94002 VENT MGMT INPAT INIT DAY: CPT

## 2025-01-01 PROCEDURE — 86706 HEP B SURFACE ANTIBODY: CPT

## 2025-01-01 PROCEDURE — 5A1955Z RESPIRATORY VENTILATION, GREATER THAN 96 CONSECUTIVE HOURS: ICD-10-PCS | Performed by: INTERNAL MEDICINE

## 2025-01-01 PROCEDURE — 99239 HOSP IP/OBS DSCHRG MGMT >30: CPT | Mod: MISDOCU | Performed by: STUDENT IN AN ORGANIZED HEALTH CARE EDUCATION/TRAINING PROGRAM

## 2025-01-01 PROCEDURE — 87070 CULTURE OTHR SPECIMN AEROBIC: CPT

## 2025-01-01 PROCEDURE — 71275 CT ANGIOGRAPHY CHEST: CPT

## 2025-01-01 PROCEDURE — 5A1D70Z PERFORMANCE OF URINARY FILTRATION, INTERMITTENT, LESS THAN 6 HOURS PER DAY: ICD-10-PCS | Performed by: INTERNAL MEDICINE

## 2025-01-01 PROCEDURE — 87651 STREP A DNA AMP PROBE: CPT

## 2025-01-01 PROCEDURE — 74018 RADEX ABDOMEN 1 VIEW: CPT

## 2025-01-01 PROCEDURE — 37799 UNLISTED PX VASCULAR SURGERY: CPT

## 2025-01-01 PROCEDURE — 87116 MYCOBACTERIA CULTURE: CPT

## 2025-01-01 PROCEDURE — 83880 ASSAY OF NATRIURETIC PEPTIDE: CPT

## 2025-01-01 PROCEDURE — 85025 COMPLETE CBC W/AUTO DIFF WBC: CPT

## 2025-01-01 PROCEDURE — 31622 DX BRONCHOSCOPE/WASH: CPT | Performed by: INTERNAL MEDICINE

## 2025-01-01 PROCEDURE — 700105 HCHG RX REV CODE 258: Performed by: STUDENT IN AN ORGANIZED HEALTH CARE EDUCATION/TRAINING PROGRAM

## 2025-01-01 PROCEDURE — 700101 HCHG RX REV CODE 250: Performed by: STUDENT IN AN ORGANIZED HEALTH CARE EDUCATION/TRAINING PROGRAM

## 2025-01-01 PROCEDURE — 99223 1ST HOSP IP/OBS HIGH 75: CPT | Mod: 25 | Performed by: INTERNAL MEDICINE

## 2025-01-01 PROCEDURE — 99292 CRITICAL CARE ADDL 30 MIN: CPT | Mod: 25 | Performed by: INTERNAL MEDICINE

## 2025-01-01 PROCEDURE — 700101 HCHG RX REV CODE 250

## 2025-01-01 PROCEDURE — C1751 CATH, INF, PER/CENT/MIDLINE: HCPCS

## 2025-01-01 RX ORDER — ATROPINE SULFATE 10 MG/ML
2 SOLUTION/ DROPS OPHTHALMIC EVERY 4 HOURS PRN
Status: DISCONTINUED | OUTPATIENT
Start: 2025-01-01 | End: 2025-01-01 | Stop reason: HOSPADM

## 2025-01-01 RX ORDER — POLYETHYLENE GLYCOL 3350 17 G/17G
1 POWDER, FOR SOLUTION ORAL
Status: DISCONTINUED | OUTPATIENT
Start: 2025-01-01 | End: 2025-01-01

## 2025-01-01 RX ORDER — HEPARIN SODIUM 1000 [USP'U]/ML
2000 INJECTION, SOLUTION INTRAVENOUS; SUBCUTANEOUS
Status: DISCONTINUED | OUTPATIENT
Start: 2025-01-01 | End: 2025-01-01 | Stop reason: HOSPADM

## 2025-01-01 RX ORDER — ACETAMINOPHEN 650 MG/1
650 SUPPOSITORY RECTAL EVERY 4 HOURS PRN
Status: DISCONTINUED | OUTPATIENT
Start: 2025-01-01 | End: 2025-01-01 | Stop reason: HOSPADM

## 2025-01-01 RX ORDER — FAMOTIDINE 20 MG/1
20 TABLET, FILM COATED ORAL EVERY 12 HOURS
Status: DISCONTINUED | OUTPATIENT
Start: 2025-01-01 | End: 2025-01-01

## 2025-01-01 RX ORDER — VECURONIUM BROMIDE 1 MG/ML
10 INJECTION, POWDER, LYOPHILIZED, FOR SOLUTION INTRAVENOUS ONCE
Status: COMPLETED | OUTPATIENT
Start: 2025-01-01 | End: 2025-01-01

## 2025-01-01 RX ORDER — HEPARIN SODIUM 1000 [USP'U]/ML
500 INJECTION, SOLUTION INTRAVENOUS; SUBCUTANEOUS
Status: DISCONTINUED | OUTPATIENT
Start: 2025-01-01 | End: 2025-01-01 | Stop reason: HOSPADM

## 2025-01-01 RX ORDER — LINEZOLID 2 MG/ML
600 INJECTION, SOLUTION INTRAVENOUS EVERY 12 HOURS
Status: DISCONTINUED | OUTPATIENT
Start: 2025-01-01 | End: 2025-01-01

## 2025-01-01 RX ORDER — HEPARIN SODIUM 1000 [USP'U]/ML
1500 INJECTION, SOLUTION INTRAVENOUS; SUBCUTANEOUS
Status: DISCONTINUED | OUTPATIENT
Start: 2025-01-01 | End: 2025-01-01

## 2025-01-01 RX ORDER — HYDROMORPHONE HYDROCHLORIDE 1 MG/ML
2 INJECTION, SOLUTION INTRAMUSCULAR; INTRAVENOUS; SUBCUTANEOUS
Status: DISCONTINUED | OUTPATIENT
Start: 2025-01-01 | End: 2025-01-01

## 2025-01-01 RX ORDER — SODIUM CHLORIDE, SODIUM LACTATE, POTASSIUM CHLORIDE, AND CALCIUM CHLORIDE .6; .31; .03; .02 G/100ML; G/100ML; G/100ML; G/100ML
500 INJECTION, SOLUTION INTRAVENOUS ONCE
Status: COMPLETED | OUTPATIENT
Start: 2025-01-01 | End: 2025-01-01

## 2025-01-01 RX ORDER — HYDRALAZINE HYDROCHLORIDE 20 MG/ML
10 INJECTION INTRAMUSCULAR; INTRAVENOUS EVERY 4 HOURS PRN
Status: DISCONTINUED | OUTPATIENT
Start: 2025-01-01 | End: 2025-01-01 | Stop reason: HOSPADM

## 2025-01-01 RX ORDER — NOREPINEPHRINE BITARTRATE 0.03 MG/ML
0-1 INJECTION, SOLUTION INTRAVENOUS CONTINUOUS
Status: DISCONTINUED | OUTPATIENT
Start: 2025-01-01 | End: 2025-01-01

## 2025-01-01 RX ORDER — GLYCOPYRROLATE 0.2 MG/ML
0.2 INJECTION INTRAMUSCULAR; INTRAVENOUS 3 TIMES DAILY PRN
Status: DISCONTINUED | OUTPATIENT
Start: 2025-01-01 | End: 2025-01-01 | Stop reason: HOSPADM

## 2025-01-01 RX ORDER — MIDAZOLAM HYDROCHLORIDE 1 MG/ML
5 INJECTION INTRAMUSCULAR; INTRAVENOUS
Status: DISCONTINUED | OUTPATIENT
Start: 2025-01-01 | End: 2025-01-01

## 2025-01-01 RX ORDER — HEPARIN SODIUM 1000 [USP'U]/ML
1000 INJECTION, SOLUTION INTRAVENOUS; SUBCUTANEOUS ONCE
Status: COMPLETED | OUTPATIENT
Start: 2025-01-01 | End: 2025-01-01

## 2025-01-01 RX ORDER — HYDROMORPHONE HYDROCHLORIDE 1 MG/ML
2 INJECTION, SOLUTION INTRAMUSCULAR; INTRAVENOUS; SUBCUTANEOUS
Status: DISCONTINUED | OUTPATIENT
Start: 2025-01-01 | End: 2025-01-01 | Stop reason: HOSPADM

## 2025-01-01 RX ORDER — ACETAMINOPHEN 325 MG/1
650 TABLET ORAL EVERY 6 HOURS PRN
Status: DISCONTINUED | OUTPATIENT
Start: 2025-01-01 | End: 2025-01-01

## 2025-01-01 RX ORDER — PROPOFOL 10 MG/ML
10 INJECTION, EMULSION INTRAVENOUS ONCE
Status: COMPLETED | OUTPATIENT
Start: 2025-01-01 | End: 2025-01-01

## 2025-01-01 RX ORDER — SODIUM CHLORIDE 9 MG/ML
100 INJECTION, SOLUTION INTRAVENOUS
Status: DISCONTINUED | OUTPATIENT
Start: 2025-01-01 | End: 2025-01-01 | Stop reason: HOSPADM

## 2025-01-01 RX ORDER — VECURONIUM BROMIDE 1 MG/ML
INJECTION, POWDER, LYOPHILIZED, FOR SOLUTION INTRAVENOUS
Status: COMPLETED
Start: 2025-01-01 | End: 2025-01-01

## 2025-01-01 RX ORDER — HEPARIN SODIUM 1000 [USP'U]/ML
2000 INJECTION, SOLUTION INTRAVENOUS; SUBCUTANEOUS ONCE
Status: COMPLETED | OUTPATIENT
Start: 2025-01-01 | End: 2025-01-01

## 2025-01-01 RX ORDER — AMOXICILLIN 250 MG
2 CAPSULE ORAL 2 TIMES DAILY
Status: DISCONTINUED | OUTPATIENT
Start: 2025-01-01 | End: 2025-01-01

## 2025-01-01 RX ORDER — ACETAMINOPHEN 325 MG/1
650 TABLET ORAL EVERY 6 HOURS PRN
Status: DISCONTINUED | OUTPATIENT
Start: 2025-01-01 | End: 2025-01-01 | Stop reason: HOSPADM

## 2025-01-01 RX ORDER — MIDAZOLAM HYDROCHLORIDE 1 MG/ML
1-5 INJECTION INTRAMUSCULAR; INTRAVENOUS
Status: DISCONTINUED | OUTPATIENT
Start: 2025-01-01 | End: 2025-01-01 | Stop reason: HOSPADM

## 2025-01-01 RX ORDER — MIDAZOLAM HYDROCHLORIDE 1 MG/ML
5 INJECTION INTRAMUSCULAR; INTRAVENOUS ONCE
Status: COMPLETED | OUTPATIENT
Start: 2025-01-01 | End: 2025-01-01

## 2025-01-01 RX ORDER — NOREPINEPHRINE BITARTRATE 0.03 MG/ML
INJECTION, SOLUTION INTRAVENOUS
Status: COMPLETED
Start: 2025-01-01 | End: 2025-01-01

## 2025-01-01 RX ORDER — ACETYLCYSTEINE 200 MG/ML
3-5 SOLUTION ORAL; RESPIRATORY (INHALATION)
Status: DISCONTINUED | OUTPATIENT
Start: 2025-01-01 | End: 2025-01-01 | Stop reason: ALTCHOICE

## 2025-01-01 RX ORDER — PROPOFOL 10 MG/ML
40 INJECTION, EMULSION INTRAVENOUS ONCE
Status: COMPLETED | OUTPATIENT
Start: 2025-01-01 | End: 2025-01-01

## 2025-01-01 RX ORDER — HYDROCORTISONE SODIUM SUCCINATE 100 MG/2ML
50 INJECTION INTRAMUSCULAR; INTRAVENOUS EVERY 6 HOURS
Status: DISCONTINUED | OUTPATIENT
Start: 2025-01-01 | End: 2025-01-01

## 2025-01-01 RX ORDER — HEPARIN SODIUM 1000 [USP'U]/ML
1400 INJECTION, SOLUTION INTRAVENOUS; SUBCUTANEOUS
Status: DISCONTINUED | OUTPATIENT
Start: 2025-01-01 | End: 2025-01-01 | Stop reason: HOSPADM

## 2025-01-01 RX ORDER — HYDROCORTISONE SODIUM SUCCINATE 100 MG/2ML
200 INJECTION INTRAMUSCULAR; INTRAVENOUS ONCE
Status: COMPLETED | OUTPATIENT
Start: 2025-01-01 | End: 2025-01-01

## 2025-01-01 RX ORDER — HYDROMORPHONE HYDROCHLORIDE 1 MG/ML
1 INJECTION, SOLUTION INTRAMUSCULAR; INTRAVENOUS; SUBCUTANEOUS
Status: DISCONTINUED | OUTPATIENT
Start: 2025-01-01 | End: 2025-01-01

## 2025-01-01 RX ORDER — IPRATROPIUM BROMIDE AND ALBUTEROL SULFATE 2.5; .5 MG/3ML; MG/3ML
3 SOLUTION RESPIRATORY (INHALATION)
Status: DISCONTINUED | OUTPATIENT
Start: 2025-01-01 | End: 2025-01-01 | Stop reason: HOSPADM

## 2025-01-01 RX ORDER — HEPARIN SODIUM 5000 [USP'U]/ML
5000 INJECTION, SOLUTION INTRAVENOUS; SUBCUTANEOUS EVERY 8 HOURS
Status: DISCONTINUED | OUTPATIENT
Start: 2025-01-01 | End: 2025-01-01

## 2025-01-01 RX ORDER — FAMOTIDINE 20 MG/1
20 TABLET, FILM COATED ORAL DAILY
Status: DISCONTINUED | OUTPATIENT
Start: 2025-01-01 | End: 2025-01-01

## 2025-01-01 RX ORDER — HEPARIN SODIUM 1000 [USP'U]/ML
INJECTION, SOLUTION INTRAVENOUS; SUBCUTANEOUS
Status: DISCONTINUED
Start: 2025-01-01 | End: 2025-01-01

## 2025-01-01 RX ORDER — ACETAMINOPHEN 325 MG/1
650 TABLET ORAL EVERY 4 HOURS PRN
Status: DISCONTINUED | OUTPATIENT
Start: 2025-01-01 | End: 2025-01-01 | Stop reason: HOSPADM

## 2025-01-01 RX ORDER — MAGNESIUM SULFATE HEPTAHYDRATE 40 MG/ML
2 INJECTION, SOLUTION INTRAVENOUS ONCE
Status: COMPLETED | OUTPATIENT
Start: 2025-01-01 | End: 2025-01-01

## 2025-01-01 RX ORDER — BISACODYL 10 MG
10 SUPPOSITORY, RECTAL RECTAL
Status: DISCONTINUED | OUTPATIENT
Start: 2025-01-01 | End: 2025-01-01

## 2025-01-01 RX ORDER — MIDAZOLAM HYDROCHLORIDE 1 MG/ML
INJECTION INTRAMUSCULAR; INTRAVENOUS
Status: COMPLETED
Start: 2025-01-01 | End: 2025-01-01

## 2025-01-01 RX ORDER — LINEZOLID 600 MG/1
600 TABLET, FILM COATED ORAL EVERY 12 HOURS
Status: DISCONTINUED | OUTPATIENT
Start: 2025-01-01 | End: 2025-01-01

## 2025-01-01 RX ORDER — ACETYLCYSTEINE 200 MG/ML
3-5 SOLUTION ORAL; RESPIRATORY (INHALATION)
Status: DISCONTINUED | OUTPATIENT
Start: 2025-01-01 | End: 2025-01-01

## 2025-01-01 RX ORDER — HYDROMORPHONE HYDROCHLORIDE 1 MG/ML
4 INJECTION, SOLUTION INTRAMUSCULAR; INTRAVENOUS; SUBCUTANEOUS
Status: DISCONTINUED | OUTPATIENT
Start: 2025-01-01 | End: 2025-01-01 | Stop reason: HOSPADM

## 2025-01-01 RX ORDER — GLYCOPYRROLATE 1 MG/1
1 TABLET ORAL 3 TIMES DAILY PRN
Status: DISCONTINUED | OUTPATIENT
Start: 2025-01-01 | End: 2025-01-01 | Stop reason: HOSPADM

## 2025-01-01 RX ADMIN — NOREPINEPHRINE BITARTRATE 0.2 MCG/KG/MIN: 0.03 INJECTION, SOLUTION INTRAVENOUS at 18:03

## 2025-01-01 RX ADMIN — LINEZOLID 600 MG: 600 TABLET, FILM COATED ORAL at 06:25

## 2025-01-01 RX ADMIN — MIDAZOLAM HYDROCHLORIDE 5 MG: 1 INJECTION INTRAMUSCULAR; INTRAVENOUS at 18:16

## 2025-01-01 RX ADMIN — HEPARIN SODIUM 2000 UNITS: 1000 INJECTION, SOLUTION INTRAVENOUS; SUBCUTANEOUS at 09:41

## 2025-01-01 RX ADMIN — DOXYCYCLINE 100 MG: 100 INJECTION, POWDER, LYOPHILIZED, FOR SOLUTION INTRAVENOUS at 18:28

## 2025-01-01 RX ADMIN — IPRATROPIUM BROMIDE AND ALBUTEROL SULFATE 3 ML: .5; 2.5 SOLUTION RESPIRATORY (INHALATION) at 18:34

## 2025-01-01 RX ADMIN — POLYETHYLENE GLYCOL 3350 1 PACKET: 17 POWDER, FOR SOLUTION ORAL at 14:34

## 2025-01-01 RX ADMIN — MIDAZOLAM HYDROCHLORIDE 5 MG/HR: 5 INJECTION, SOLUTION INTRAMUSCULAR; INTRAVENOUS at 15:43

## 2025-01-01 RX ADMIN — PIPERACILLIN AND TAZOBACTAM 4.5 G: 4; .5 INJECTION, POWDER, FOR SOLUTION INTRAVENOUS at 14:09

## 2025-01-01 RX ADMIN — IPRATROPIUM BROMIDE AND ALBUTEROL SULFATE 3 ML: .5; 2.5 SOLUTION RESPIRATORY (INHALATION) at 02:46

## 2025-01-01 RX ADMIN — AMPICILLIN AND SULBACTAM 3 G: 2; 1 INJECTION, POWDER, FOR SOLUTION INTRAMUSCULAR; INTRAVENOUS at 06:03

## 2025-01-01 RX ADMIN — HEPARIN SODIUM 1400 UNITS: 1000 INJECTION, SOLUTION INTRAVENOUS; SUBCUTANEOUS at 14:15

## 2025-01-01 RX ADMIN — LINEZOLID 600 MG: 600 INJECTION, SOLUTION INTRAVENOUS at 17:18

## 2025-01-01 RX ADMIN — IPRATROPIUM BROMIDE AND ALBUTEROL SULFATE 3 ML: .5; 2.5 SOLUTION RESPIRATORY (INHALATION) at 02:34

## 2025-01-01 RX ADMIN — VECURONIUM BROMIDE 10 MG: 1 INJECTION, POWDER, LYOPHILIZED, FOR SOLUTION INTRAVENOUS at 07:17

## 2025-01-01 RX ADMIN — HEPARIN SODIUM 1400 UNITS: 1000 INJECTION, SOLUTION INTRAVENOUS; SUBCUTANEOUS at 13:47

## 2025-01-01 RX ADMIN — NOREPINEPHRINE BITARTRATE 0.02 MCG/KG/MIN: 0.03 INJECTION, SOLUTION INTRAVENOUS at 14:03

## 2025-01-01 RX ADMIN — HEPARIN SODIUM 5000 UNITS: 5000 INJECTION, SOLUTION INTRAVENOUS; SUBCUTANEOUS at 21:45

## 2025-01-01 RX ADMIN — SENNOSIDES AND DOCUSATE SODIUM 2 TABLET: 50; 8.6 TABLET ORAL at 17:15

## 2025-01-01 RX ADMIN — IMMUNE GLOBULIN (HUMAN) 25 G: 10 INJECTION INTRAVENOUS; SUBCUTANEOUS at 11:20

## 2025-01-01 RX ADMIN — HEPARIN SODIUM 500 UNITS: 1000 INJECTION, SOLUTION INTRAVENOUS; SUBCUTANEOUS at 06:51

## 2025-01-01 RX ADMIN — IMMUNE GLOBULIN (HUMAN) 25 G: 10 INJECTION INTRAVENOUS; SUBCUTANEOUS at 12:30

## 2025-01-01 RX ADMIN — ACETYLCYSTEINE 4 ML: 200 SOLUTION ORAL; RESPIRATORY (INHALATION) at 14:36

## 2025-01-01 RX ADMIN — HEPARIN SODIUM 5000 UNITS: 5000 INJECTION, SOLUTION INTRAVENOUS; SUBCUTANEOUS at 13:29

## 2025-01-01 RX ADMIN — DOXYCYCLINE 100 MG: 100 INJECTION, POWDER, LYOPHILIZED, FOR SOLUTION INTRAVENOUS at 17:24

## 2025-01-01 RX ADMIN — HEPARIN SODIUM 500 UNITS: 1000 INJECTION, SOLUTION INTRAVENOUS; SUBCUTANEOUS at 09:28

## 2025-01-01 RX ADMIN — ACETYLCYSTEINE 4 ML: 200 SOLUTION ORAL; RESPIRATORY (INHALATION) at 02:16

## 2025-01-01 RX ADMIN — HYDROMORPHONE HYDROCHLORIDE 1 MG: 1 INJECTION, SOLUTION INTRAMUSCULAR; INTRAVENOUS; SUBCUTANEOUS at 04:32

## 2025-01-01 RX ADMIN — PROPOFOL 10 MCG/KG/MIN: 10 INJECTION, EMULSION INTRAVENOUS at 13:59

## 2025-01-01 RX ADMIN — FAMOTIDINE 20 MG: 10 INJECTION, SOLUTION INTRAVENOUS at 05:32

## 2025-01-01 RX ADMIN — HEPARIN SODIUM 1400 UNITS: 1000 INJECTION, SOLUTION INTRAVENOUS; SUBCUTANEOUS at 10:30

## 2025-01-01 RX ADMIN — Medication 300 MCG/HR: at 08:49

## 2025-01-01 RX ADMIN — MIDAZOLAM HYDROCHLORIDE 9 MG/HR: 5 INJECTION, SOLUTION INTRAMUSCULAR; INTRAVENOUS at 04:20

## 2025-01-01 RX ADMIN — NOREPINEPHRINE BITARTRATE 0.15 MCG/KG/MIN: 0.03 INJECTION, SOLUTION INTRAVENOUS at 08:54

## 2025-01-01 RX ADMIN — HEPARIN SODIUM 1400 UNITS: 1000 INJECTION, SOLUTION INTRAVENOUS; SUBCUTANEOUS at 13:48

## 2025-01-01 RX ADMIN — DOXYCYCLINE 100 MG: 100 INJECTION, POWDER, LYOPHILIZED, FOR SOLUTION INTRAVENOUS at 18:11

## 2025-01-01 RX ADMIN — DOXYCYCLINE 100 MG: 100 INJECTION, POWDER, LYOPHILIZED, FOR SOLUTION INTRAVENOUS at 17:17

## 2025-01-01 RX ADMIN — HEPARIN SODIUM 5000 UNITS: 5000 INJECTION, SOLUTION INTRAVENOUS; SUBCUTANEOUS at 22:18

## 2025-01-01 RX ADMIN — PROPOFOL 80 MCG/KG/MIN: 10 INJECTION, EMULSION INTRAVENOUS at 19:50

## 2025-01-01 RX ADMIN — ACETAMINOPHEN 650 MG: 325 TABLET ORAL at 22:22

## 2025-01-01 RX ADMIN — Medication 1.1 MG/HR: at 01:46

## 2025-01-01 RX ADMIN — PROPOFOL 80 MCG/KG/MIN: 10 INJECTION, EMULSION INTRAVENOUS at 09:28

## 2025-01-01 RX ADMIN — IOHEXOL 100 ML: 350 INJECTION, SOLUTION INTRAVENOUS at 09:53

## 2025-01-01 RX ADMIN — HEPARIN SODIUM 5000 UNITS: 5000 INJECTION, SOLUTION INTRAVENOUS; SUBCUTANEOUS at 14:25

## 2025-01-01 RX ADMIN — ACETYLCYSTEINE 3 ML: 200 SOLUTION ORAL; RESPIRATORY (INHALATION) at 19:23

## 2025-01-01 RX ADMIN — PROPOFOL 80 MCG/KG/MIN: 10 INJECTION, EMULSION INTRAVENOUS at 18:16

## 2025-01-01 RX ADMIN — NOREPINEPHRINE BITARTRATE 0.14 MCG/KG/MIN: 0.03 INJECTION, SOLUTION INTRAVENOUS at 22:22

## 2025-01-01 RX ADMIN — HEPARIN SODIUM 5000 UNITS: 5000 INJECTION, SOLUTION INTRAVENOUS; SUBCUTANEOUS at 13:28

## 2025-01-01 RX ADMIN — GLYCOPYRROLATE 0.2 MG: 0.2 INJECTION INTRAMUSCULAR; INTRAVENOUS at 10:16

## 2025-01-01 RX ADMIN — MIDAZOLAM HYDROCHLORIDE 5 MG: 1 INJECTION, SOLUTION INTRAMUSCULAR; INTRAVENOUS at 16:01

## 2025-01-01 RX ADMIN — MIDAZOLAM HYDROCHLORIDE 10 MG/HR: 5 INJECTION, SOLUTION INTRAMUSCULAR; INTRAVENOUS at 03:57

## 2025-01-01 RX ADMIN — HEPARIN SODIUM 1400 UNITS: 1000 INJECTION, SOLUTION INTRAVENOUS; SUBCUTANEOUS at 13:10

## 2025-01-01 RX ADMIN — HEPARIN SODIUM 5000 UNITS: 5000 INJECTION, SOLUTION INTRAVENOUS; SUBCUTANEOUS at 14:44

## 2025-01-01 RX ADMIN — MAGNESIUM SULFATE HEPTAHYDRATE 2 G: 2 INJECTION, SOLUTION INTRAVENOUS at 18:45

## 2025-01-01 RX ADMIN — ACETYLCYSTEINE 4 ML: 200 SOLUTION ORAL; RESPIRATORY (INHALATION) at 14:45

## 2025-01-01 RX ADMIN — HYDROCORTISONE SODIUM SUCCINATE 200 MG: 100 INJECTION, POWDER, FOR SOLUTION INTRAMUSCULAR; INTRAVENOUS at 20:12

## 2025-01-01 RX ADMIN — HEPARIN SODIUM 1000 UNITS: 1000 INJECTION, SOLUTION INTRAVENOUS; SUBCUTANEOUS at 13:15

## 2025-01-01 RX ADMIN — PROPOFOL 80 MCG/KG/MIN: 10 INJECTION, EMULSION INTRAVENOUS at 09:06

## 2025-01-01 RX ADMIN — SENNOSIDES AND DOCUSATE SODIUM 2 TABLET: 50; 8.6 TABLET ORAL at 18:17

## 2025-01-01 RX ADMIN — PROPOFOL 80 MCG/KG/MIN: 10 INJECTION, EMULSION INTRAVENOUS at 15:57

## 2025-01-01 RX ADMIN — HYDROCORTISONE SODIUM SUCCINATE 50 MG: 100 INJECTION, POWDER, FOR SOLUTION INTRAMUSCULAR; INTRAVENOUS at 05:02

## 2025-01-01 RX ADMIN — PROPOFOL 50 MCG/KG/MIN: 10 INJECTION, EMULSION INTRAVENOUS at 07:37

## 2025-01-01 RX ADMIN — HEPARIN SODIUM 1400 UNITS: 1000 INJECTION, SOLUTION INTRAVENOUS; SUBCUTANEOUS at 12:37

## 2025-01-01 RX ADMIN — LINEZOLID 600 MG: 600 TABLET, FILM COATED ORAL at 17:20

## 2025-01-01 RX ADMIN — Medication 1.1 MG/HR: at 15:57

## 2025-01-01 RX ADMIN — IPRATROPIUM BROMIDE AND ALBUTEROL SULFATE 3 ML: .5; 2.5 SOLUTION RESPIRATORY (INHALATION) at 10:16

## 2025-01-01 RX ADMIN — LINEZOLID 600 MG: 600 INJECTION, SOLUTION INTRAVENOUS at 05:24

## 2025-01-01 RX ADMIN — DOXYCYCLINE 100 MG: 100 INJECTION, POWDER, LYOPHILIZED, FOR SOLUTION INTRAVENOUS at 05:32

## 2025-01-01 RX ADMIN — Medication 1.1 MG/HR: at 02:56

## 2025-01-01 RX ADMIN — PROPOFOL 80 MCG/KG/MIN: 10 INJECTION, EMULSION INTRAVENOUS at 12:21

## 2025-01-01 RX ADMIN — DOXYCYCLINE 100 MG: 100 INJECTION, POWDER, LYOPHILIZED, FOR SOLUTION INTRAVENOUS at 05:33

## 2025-01-01 RX ADMIN — Medication 100 MCG/HR: at 14:15

## 2025-01-01 RX ADMIN — PROPOFOL 80 MCG/KG/MIN: 10 INJECTION, EMULSION INTRAVENOUS at 00:39

## 2025-01-01 RX ADMIN — SODIUM CHLORIDE, POTASSIUM CHLORIDE, SODIUM LACTATE AND CALCIUM CHLORIDE 500 ML: 600; 310; 30; 20 INJECTION, SOLUTION INTRAVENOUS at 18:43

## 2025-01-01 RX ADMIN — NOREPINEPHRINE BITARTRATE 0.05 MCG/KG/MIN: 1 INJECTION INTRAVENOUS at 10:51

## 2025-01-01 RX ADMIN — AMPICILLIN AND SULBACTAM 3 G: 2; 1 INJECTION, POWDER, FOR SOLUTION INTRAMUSCULAR; INTRAVENOUS at 23:02

## 2025-01-01 RX ADMIN — ACETYLCYSTEINE 4 ML: 200 SOLUTION ORAL; RESPIRATORY (INHALATION) at 06:52

## 2025-01-01 RX ADMIN — PROPOFOL 80 MCG/KG/MIN: 10 INJECTION, EMULSION INTRAVENOUS at 21:13

## 2025-01-01 RX ADMIN — HEPARIN SODIUM 1400 UNITS: 1000 INJECTION, SOLUTION INTRAVENOUS; SUBCUTANEOUS at 11:50

## 2025-01-01 RX ADMIN — HYDROCORTISONE SODIUM SUCCINATE 50 MG: 100 INJECTION, POWDER, FOR SOLUTION INTRAMUSCULAR; INTRAVENOUS at 23:31

## 2025-01-01 RX ADMIN — HEPARIN SODIUM 5000 UNITS: 5000 INJECTION, SOLUTION INTRAVENOUS; SUBCUTANEOUS at 21:25

## 2025-01-01 RX ADMIN — PROPOFOL 80 MCG/KG/MIN: 10 INJECTION, EMULSION INTRAVENOUS at 07:41

## 2025-01-01 RX ADMIN — ACETAMINOPHEN 650 MG: 325 TABLET ORAL at 01:47

## 2025-01-01 RX ADMIN — PROPOFOL 80 MCG/KG/MIN: 10 INJECTION, EMULSION INTRAVENOUS at 22:55

## 2025-01-01 RX ADMIN — MIDAZOLAM HYDROCHLORIDE 5 MG: 1 INJECTION, SOLUTION INTRAMUSCULAR; INTRAVENOUS at 10:40

## 2025-01-01 RX ADMIN — PIPERACILLIN AND TAZOBACTAM 4.5 G: 4; .5 INJECTION, POWDER, FOR SOLUTION INTRAVENOUS at 17:25

## 2025-01-01 RX ADMIN — PROPOFOL 80 MCG/KG/MIN: 10 INJECTION, EMULSION INTRAVENOUS at 12:26

## 2025-01-01 RX ADMIN — ACETYLCYSTEINE 4 ML: 200 SOLUTION ORAL; RESPIRATORY (INHALATION) at 22:50

## 2025-01-01 RX ADMIN — HEPARIN SODIUM 5000 UNITS: 5000 INJECTION, SOLUTION INTRAVENOUS; SUBCUTANEOUS at 06:25

## 2025-01-01 RX ADMIN — HEPARIN SODIUM 1400 UNITS: 1000 INJECTION, SOLUTION INTRAVENOUS; SUBCUTANEOUS at 10:01

## 2025-01-01 RX ADMIN — PROPOFOL 80 MCG/KG/MIN: 10 INJECTION, EMULSION INTRAVENOUS at 05:54

## 2025-01-01 RX ADMIN — FAMOTIDINE 20 MG: 10 INJECTION, SOLUTION INTRAVENOUS at 17:15

## 2025-01-01 RX ADMIN — LINEZOLID 600 MG: 600 INJECTION, SOLUTION INTRAVENOUS at 10:55

## 2025-01-01 RX ADMIN — AMPICILLIN AND SULBACTAM 3 G: 2; 1 INJECTION, POWDER, FOR SOLUTION INTRAMUSCULAR; INTRAVENOUS at 00:08

## 2025-01-01 RX ADMIN — NOREPINEPHRINE BITARTRATE 0.12 MCG/KG/MIN: 0.03 INJECTION, SOLUTION INTRAVENOUS at 07:22

## 2025-01-01 RX ADMIN — PROPOFOL 80 MCG/KG/MIN: 10 INJECTION, EMULSION INTRAVENOUS at 19:20

## 2025-01-01 RX ADMIN — LINEZOLID 600 MG: 600 TABLET, FILM COATED ORAL at 05:30

## 2025-01-01 RX ADMIN — HEPARIN SODIUM 2000 UNITS: 1000 INJECTION, SOLUTION INTRAVENOUS; SUBCUTANEOUS at 12:37

## 2025-01-01 RX ADMIN — ACETYLCYSTEINE 4 ML: 200 SOLUTION ORAL; RESPIRATORY (INHALATION) at 11:58

## 2025-01-01 RX ADMIN — PIPERACILLIN AND TAZOBACTAM 4.5 G: 4; .5 INJECTION, POWDER, FOR SOLUTION INTRAVENOUS at 06:32

## 2025-01-01 RX ADMIN — NOREPINEPHRINE BITARTRATE 0.13 MCG/KG/MIN: 0.03 INJECTION, SOLUTION INTRAVENOUS at 16:38

## 2025-01-01 RX ADMIN — DOXYCYCLINE 100 MG: 100 INJECTION, POWDER, LYOPHILIZED, FOR SOLUTION INTRAVENOUS at 05:26

## 2025-01-01 RX ADMIN — LINEZOLID 600 MG: 600 INJECTION, SOLUTION INTRAVENOUS at 17:10

## 2025-01-01 RX ADMIN — AMPICILLIN AND SULBACTAM 3 G: 2; 1 INJECTION, POWDER, FOR SOLUTION INTRAMUSCULAR; INTRAVENOUS at 11:47

## 2025-01-01 RX ADMIN — SENNOSIDES AND DOCUSATE SODIUM 2 TABLET: 50; 8.6 TABLET ORAL at 06:00

## 2025-01-01 RX ADMIN — ACETAMINOPHEN 650 MG: 325 TABLET ORAL at 13:10

## 2025-01-01 RX ADMIN — IMMUNE GLOBULIN (HUMAN) 50 G: 10 INJECTION INTRAVENOUS; SUBCUTANEOUS at 13:39

## 2025-01-01 RX ADMIN — ACETAMINOPHEN 650 MG: 325 TABLET ORAL at 19:42

## 2025-01-01 RX ADMIN — HEPARIN SODIUM 1400 UNITS: 1000 INJECTION, SOLUTION INTRAVENOUS; SUBCUTANEOUS at 13:16

## 2025-01-01 RX ADMIN — Medication 0.9 MG/HR: at 19:29

## 2025-01-01 RX ADMIN — NOREPINEPHRINE BITARTRATE 0.18 MCG/KG/MIN: 0.03 INJECTION, SOLUTION INTRAVENOUS at 08:40

## 2025-01-01 RX ADMIN — PROPOFOL 20 MCG/KG/MIN: 10 INJECTION, EMULSION INTRAVENOUS at 20:03

## 2025-01-01 RX ADMIN — Medication 0.9 MG/HR: at 08:41

## 2025-01-01 RX ADMIN — HEPARIN SODIUM 1500 UNITS: 1000 INJECTION, SOLUTION INTRAVENOUS; SUBCUTANEOUS at 07:03

## 2025-01-01 RX ADMIN — NOREPINEPHRINE BITARTRATE 0.18 MCG/KG/MIN: 0.03 INJECTION, SOLUTION INTRAVENOUS at 19:20

## 2025-01-01 RX ADMIN — ACETYLCYSTEINE 4 ML: 200 SOLUTION ORAL; RESPIRATORY (INHALATION) at 10:16

## 2025-01-01 RX ADMIN — HYDROMORPHONE HYDROCHLORIDE 4 MG: 1 INJECTION, SOLUTION INTRAMUSCULAR; INTRAVENOUS; SUBCUTANEOUS at 10:15

## 2025-01-01 RX ADMIN — FAMOTIDINE 20 MG: 10 INJECTION, SOLUTION INTRAVENOUS at 05:01

## 2025-01-01 RX ADMIN — SENNOSIDES AND DOCUSATE SODIUM 2 TABLET: 50; 8.6 TABLET ORAL at 05:19

## 2025-01-01 RX ADMIN — Medication 1.1 MG/HR: at 10:47

## 2025-01-01 RX ADMIN — SODIUM CHLORIDE, POTASSIUM CHLORIDE, SODIUM LACTATE AND CALCIUM CHLORIDE 500 ML: 600; 310; 30; 20 INJECTION, SOLUTION INTRAVENOUS at 18:07

## 2025-01-01 RX ADMIN — HEPARIN SODIUM 5000 UNITS: 5000 INJECTION, SOLUTION INTRAVENOUS; SUBCUTANEOUS at 05:14

## 2025-01-01 RX ADMIN — SENNOSIDES AND DOCUSATE SODIUM 2 TABLET: 50; 8.6 TABLET ORAL at 06:24

## 2025-01-01 RX ADMIN — PROPOFOL 80 MCG/KG/MIN: 10 INJECTION, EMULSION INTRAVENOUS at 13:11

## 2025-01-01 RX ADMIN — HEPARIN SODIUM 5000 UNITS: 5000 INJECTION, SOLUTION INTRAVENOUS; SUBCUTANEOUS at 21:09

## 2025-01-01 RX ADMIN — ACETAMINOPHEN 650 MG: 325 TABLET ORAL at 13:28

## 2025-01-01 RX ADMIN — HEPARIN SODIUM 5000 UNITS: 5000 INJECTION, SOLUTION INTRAVENOUS; SUBCUTANEOUS at 05:31

## 2025-01-01 RX ADMIN — MIDAZOLAM HYDROCHLORIDE 5 MG: 1 INJECTION, SOLUTION INTRAMUSCULAR; INTRAVENOUS at 18:16

## 2025-01-01 RX ADMIN — HEPARIN SODIUM 5000 UNITS: 5000 INJECTION, SOLUTION INTRAVENOUS; SUBCUTANEOUS at 06:30

## 2025-01-01 RX ADMIN — LINEZOLID 600 MG: 600 TABLET, FILM COATED ORAL at 17:51

## 2025-01-01 RX ADMIN — HYDROCORTISONE SODIUM SUCCINATE 50 MG: 100 INJECTION, POWDER, FOR SOLUTION INTRAMUSCULAR; INTRAVENOUS at 17:51

## 2025-01-01 RX ADMIN — AMPICILLIN AND SULBACTAM 3 G: 2; 1 INJECTION, POWDER, FOR SOLUTION INTRAMUSCULAR; INTRAVENOUS at 17:15

## 2025-01-01 RX ADMIN — FAMOTIDINE 20 MG: 10 INJECTION, SOLUTION INTRAVENOUS at 06:05

## 2025-01-01 RX ADMIN — DOXYCYCLINE 100 MG: 100 INJECTION, POWDER, LYOPHILIZED, FOR SOLUTION INTRAVENOUS at 05:21

## 2025-01-01 RX ADMIN — Medication 0.5 MG/HR: at 12:43

## 2025-01-01 RX ADMIN — LINEZOLID 600 MG: 600 TABLET, FILM COATED ORAL at 05:31

## 2025-01-01 RX ADMIN — SENNOSIDES AND DOCUSATE SODIUM 2 TABLET: 50; 8.6 TABLET ORAL at 05:14

## 2025-01-01 RX ADMIN — MIDAZOLAM HYDROCHLORIDE 10 MG/HR: 5 INJECTION, SOLUTION INTRAMUSCULAR; INTRAVENOUS at 15:39

## 2025-01-01 RX ADMIN — SENNOSIDES AND DOCUSATE SODIUM 2 TABLET: 50; 8.6 TABLET ORAL at 17:28

## 2025-01-01 RX ADMIN — AMPICILLIN AND SULBACTAM 3 G: 2; 1 INJECTION, POWDER, FOR SOLUTION INTRAMUSCULAR; INTRAVENOUS at 17:33

## 2025-01-01 RX ADMIN — ACETYLCYSTEINE 4 ML: 200 SOLUTION ORAL; RESPIRATORY (INHALATION) at 18:34

## 2025-01-01 RX ADMIN — PROPOFOL 20 MCG/KG/MIN: 10 INJECTION, EMULSION INTRAVENOUS at 08:23

## 2025-01-01 RX ADMIN — FAMOTIDINE 20 MG: 20 TABLET, FILM COATED ORAL at 18:17

## 2025-01-01 RX ADMIN — ACETYLCYSTEINE 4 ML: 200 SOLUTION ORAL; RESPIRATORY (INHALATION) at 11:17

## 2025-01-01 RX ADMIN — Medication 1.1 MG/HR: at 19:47

## 2025-01-01 RX ADMIN — LINEZOLID 600 MG: 600 TABLET, FILM COATED ORAL at 05:07

## 2025-01-01 RX ADMIN — FAMOTIDINE 20 MG: 10 INJECTION, SOLUTION INTRAVENOUS at 17:14

## 2025-01-01 RX ADMIN — HEPARIN SODIUM 5000 UNITS: 5000 INJECTION, SOLUTION INTRAVENOUS; SUBCUTANEOUS at 23:24

## 2025-01-01 RX ADMIN — PROPOFOL 80 MCG/KG/MIN: 10 INJECTION, EMULSION INTRAVENOUS at 15:54

## 2025-01-01 RX ADMIN — SODIUM CHLORIDE, POTASSIUM CHLORIDE, SODIUM LACTATE AND CALCIUM CHLORIDE 500 ML: 600; 310; 30; 20 INJECTION, SOLUTION INTRAVENOUS at 11:15

## 2025-01-01 RX ADMIN — AMPICILLIN AND SULBACTAM 3 G: 2; 1 INJECTION, POWDER, FOR SOLUTION INTRAMUSCULAR; INTRAVENOUS at 05:18

## 2025-01-01 RX ADMIN — MIDAZOLAM HYDROCHLORIDE 10 MG/HR: 5 INJECTION, SOLUTION INTRAMUSCULAR; INTRAVENOUS at 16:39

## 2025-01-01 RX ADMIN — IPRATROPIUM BROMIDE AND ALBUTEROL SULFATE 3 ML: .5; 2.5 SOLUTION RESPIRATORY (INHALATION) at 18:43

## 2025-01-01 RX ADMIN — HEPARIN SODIUM 5000 UNITS: 5000 INJECTION, SOLUTION INTRAVENOUS; SUBCUTANEOUS at 15:18

## 2025-01-01 RX ADMIN — Medication 1.1 MG/HR: at 10:22

## 2025-01-01 RX ADMIN — HEPARIN SODIUM 5000 UNITS: 5000 INJECTION, SOLUTION INTRAVENOUS; SUBCUTANEOUS at 05:30

## 2025-01-01 RX ADMIN — Medication 1.1 MG/HR: at 06:42

## 2025-01-01 RX ADMIN — VECURONIUM BROMIDE 10 MG: 1 INJECTION, POWDER, LYOPHILIZED, FOR SOLUTION INTRAVENOUS at 07:35

## 2025-01-01 RX ADMIN — DOXYCYCLINE 100 MG: 100 INJECTION, POWDER, LYOPHILIZED, FOR SOLUTION INTRAVENOUS at 17:30

## 2025-01-01 RX ADMIN — MIDAZOLAM HYDROCHLORIDE 5 MG: 1 INJECTION, SOLUTION INTRAMUSCULAR; INTRAVENOUS at 07:12

## 2025-01-01 RX ADMIN — PROPOFOL 40 MG: 10 INJECTION, EMULSION INTRAVENOUS at 07:15

## 2025-01-01 RX ADMIN — HEPARIN SODIUM 1400 UNITS: 1000 INJECTION, SOLUTION INTRAVENOUS; SUBCUTANEOUS at 13:13

## 2025-01-01 RX ADMIN — PROPOFOL 10 MG: 10 INJECTION, EMULSION INTRAVENOUS at 09:45

## 2025-01-01 RX ADMIN — HYDROCORTISONE SODIUM SUCCINATE 50 MG: 100 INJECTION, POWDER, FOR SOLUTION INTRAMUSCULAR; INTRAVENOUS at 01:07

## 2025-01-01 RX ADMIN — AZITHROMYCIN DIHYDRATE 500 MG: 500 INJECTION, POWDER, LYOPHILIZED, FOR SOLUTION INTRAVENOUS at 18:13

## 2025-01-01 RX ADMIN — HYDROMORPHONE HYDROCHLORIDE 4 MG: 1 INJECTION, SOLUTION INTRAMUSCULAR; INTRAVENOUS; SUBCUTANEOUS at 10:32

## 2025-01-01 RX ADMIN — IPRATROPIUM BROMIDE AND ALBUTEROL SULFATE 3 ML: .5; 2.5 SOLUTION RESPIRATORY (INHALATION) at 06:43

## 2025-01-01 RX ADMIN — ACETYLCYSTEINE 4 ML: 200 SOLUTION ORAL; RESPIRATORY (INHALATION) at 07:00

## 2025-01-01 RX ADMIN — Medication 1.1 MG/HR: at 10:29

## 2025-01-01 RX ADMIN — DOXYCYCLINE 100 MG: 100 INJECTION, POWDER, LYOPHILIZED, FOR SOLUTION INTRAVENOUS at 17:23

## 2025-01-01 RX ADMIN — PIPERACILLIN AND TAZOBACTAM 4.5 G: 4; .5 INJECTION, POWDER, FOR SOLUTION INTRAVENOUS at 18:01

## 2025-01-01 RX ADMIN — Medication 1.1 MG/HR: at 18:12

## 2025-01-01 RX ADMIN — HUMAN ALBUMIN MICROSPHERES AND PERFLUTREN 3 ML: 10; .22 INJECTION, SOLUTION INTRAVENOUS at 17:30

## 2025-01-01 RX ADMIN — HYDROCORTISONE SODIUM SUCCINATE 50 MG: 100 INJECTION, POWDER, FOR SOLUTION INTRAMUSCULAR; INTRAVENOUS at 17:20

## 2025-01-01 RX ADMIN — PIPERACILLIN AND TAZOBACTAM 4.5 G: 4; .5 INJECTION, POWDER, FOR SOLUTION INTRAVENOUS at 13:29

## 2025-01-01 RX ADMIN — LINEZOLID 600 MG: 600 TABLET, FILM COATED ORAL at 17:07

## 2025-01-01 RX ADMIN — HEPARIN SODIUM 5000 UNITS: 5000 INJECTION, SOLUTION INTRAVENOUS; SUBCUTANEOUS at 21:08

## 2025-01-01 RX ADMIN — PROPOFOL 65 MCG/KG/MIN: 10 INJECTION, EMULSION INTRAVENOUS at 04:49

## 2025-01-01 RX ADMIN — FAMOTIDINE 20 MG: 10 INJECTION, SOLUTION INTRAVENOUS at 05:16

## 2025-01-01 RX ADMIN — HYDROCORTISONE SODIUM SUCCINATE 50 MG: 100 INJECTION, POWDER, FOR SOLUTION INTRAMUSCULAR; INTRAVENOUS at 05:16

## 2025-01-01 RX ADMIN — LINEZOLID 600 MG: 600 INJECTION, SOLUTION INTRAVENOUS at 05:26

## 2025-01-01 RX ADMIN — SENNOSIDES AND DOCUSATE SODIUM 2 TABLET: 50; 8.6 TABLET ORAL at 17:14

## 2025-01-01 RX ADMIN — PROPOFOL 80 MCG/KG/MIN: 10 INJECTION, EMULSION INTRAVENOUS at 23:39

## 2025-01-01 RX ADMIN — SENNOSIDES AND DOCUSATE SODIUM 2 TABLET: 50; 8.6 TABLET ORAL at 06:05

## 2025-01-01 RX ADMIN — PIPERACILLIN AND TAZOBACTAM 4.5 G: 4; .5 INJECTION, POWDER, FOR SOLUTION INTRAVENOUS at 07:30

## 2025-01-01 RX ADMIN — FAMOTIDINE 20 MG: 10 INJECTION, SOLUTION INTRAVENOUS at 05:30

## 2025-01-01 RX ADMIN — PROPOFOL 80 MCG/KG/MIN: 10 INJECTION, EMULSION INTRAVENOUS at 02:19

## 2025-01-01 RX ADMIN — ACETYLCYSTEINE 4 ML: 200 SOLUTION ORAL; RESPIRATORY (INHALATION) at 19:02

## 2025-01-01 RX ADMIN — SENNOSIDES AND DOCUSATE SODIUM 2 TABLET: 50; 8.6 TABLET ORAL at 05:31

## 2025-01-01 RX ADMIN — HYDROCORTISONE SODIUM SUCCINATE 50 MG: 100 INJECTION, POWDER, FOR SOLUTION INTRAMUSCULAR; INTRAVENOUS at 05:30

## 2025-01-01 RX ADMIN — Medication 0.7 MG/HR: at 21:08

## 2025-01-01 RX ADMIN — IPRATROPIUM BROMIDE AND ALBUTEROL SULFATE 3 ML: .5; 2.5 SOLUTION RESPIRATORY (INHALATION) at 14:36

## 2025-01-01 RX ADMIN — ACETYLCYSTEINE 4 ML: 200 SOLUTION ORAL; RESPIRATORY (INHALATION) at 18:43

## 2025-01-01 RX ADMIN — HEPARIN SODIUM 1400 UNITS: 1000 INJECTION, SOLUTION INTRAVENOUS; SUBCUTANEOUS at 10:00

## 2025-01-01 RX ADMIN — NOREPINEPHRINE BITARTRATE 0.05 MCG/KG/MIN: 1 INJECTION INTRAVENOUS at 19:49

## 2025-01-01 RX ADMIN — PIPERACILLIN AND TAZOBACTAM 4.5 G: 4; .5 INJECTION, POWDER, FOR SOLUTION INTRAVENOUS at 10:51

## 2025-01-01 RX ADMIN — HYDROCORTISONE SODIUM SUCCINATE 50 MG: 100 INJECTION, POWDER, FOR SOLUTION INTRAMUSCULAR; INTRAVENOUS at 12:23

## 2025-01-01 RX ADMIN — Medication 1.1 MG/HR: at 15:40

## 2025-01-01 RX ADMIN — FENTANYL CITRATE 100 MCG: 50 INJECTION, SOLUTION INTRAMUSCULAR; INTRAVENOUS at 14:30

## 2025-01-01 RX ADMIN — Medication 1.1 MG/HR: at 03:33

## 2025-01-01 RX ADMIN — DOXYCYCLINE 100 MG: 100 INJECTION, POWDER, LYOPHILIZED, FOR SOLUTION INTRAVENOUS at 05:05

## 2025-01-01 RX ADMIN — PROPOFOL 20 MCG/KG/MIN: 10 INJECTION, EMULSION INTRAVENOUS at 20:05

## 2025-01-01 RX ADMIN — FAMOTIDINE 20 MG: 10 INJECTION, SOLUTION INTRAVENOUS at 05:19

## 2025-01-01 RX ADMIN — ACETYLCYSTEINE 4 ML: 200 SOLUTION ORAL; RESPIRATORY (INHALATION) at 06:43

## 2025-01-01 RX ADMIN — MIDAZOLAM HYDROCHLORIDE 5 MG: 1 INJECTION, SOLUTION INTRAMUSCULAR; INTRAVENOUS at 10:15

## 2025-01-01 RX ADMIN — MIDAZOLAM HYDROCHLORIDE 5 MG: 1 INJECTION, SOLUTION INTRAMUSCULAR; INTRAVENOUS at 10:30

## 2025-01-01 RX ADMIN — Medication 1.1 MG/HR: at 09:11

## 2025-01-01 RX ADMIN — PROPOFOL 80 MCG/KG/MIN: 10 INJECTION, EMULSION INTRAVENOUS at 03:47

## 2025-01-01 RX ADMIN — FENTANYL CITRATE 100 MCG: 50 INJECTION, SOLUTION INTRAMUSCULAR; INTRAVENOUS at 14:11

## 2025-01-01 RX ADMIN — AZITHROMYCIN DIHYDRATE 500 MG: 500 INJECTION, POWDER, LYOPHILIZED, FOR SOLUTION INTRAVENOUS at 06:38

## 2025-01-01 RX ADMIN — HEPARIN SODIUM 5000 UNITS: 5000 INJECTION, SOLUTION INTRAVENOUS; SUBCUTANEOUS at 13:33

## 2025-01-01 RX ADMIN — AMPICILLIN AND SULBACTAM 3 G: 2; 1 INJECTION, POWDER, FOR SOLUTION INTRAMUSCULAR; INTRAVENOUS at 17:13

## 2025-01-01 RX ADMIN — HYDROCORTISONE SODIUM SUCCINATE 50 MG: 100 INJECTION, POWDER, FOR SOLUTION INTRAMUSCULAR; INTRAVENOUS at 23:23

## 2025-01-01 RX ADMIN — HEPARIN SODIUM 5000 UNITS: 5000 INJECTION, SOLUTION INTRAVENOUS; SUBCUTANEOUS at 06:05

## 2025-01-01 RX ADMIN — LINEZOLID 600 MG: 600 TABLET, FILM COATED ORAL at 17:28

## 2025-01-01 RX ADMIN — HEPARIN SODIUM 5000 UNITS: 5000 INJECTION, SOLUTION INTRAVENOUS; SUBCUTANEOUS at 19:59

## 2025-01-01 RX ADMIN — Medication 200 MCG/HR: at 20:41

## 2025-01-01 RX ADMIN — VECURONIUM BROMIDE 10 MG: 1 INJECTION, POWDER, LYOPHILIZED, FOR SOLUTION INTRAVENOUS at 16:25

## 2025-01-01 RX ADMIN — HEPARIN SODIUM 5000 UNITS: 5000 INJECTION, SOLUTION INTRAVENOUS; SUBCUTANEOUS at 05:19

## 2025-01-01 RX ADMIN — FAMOTIDINE 20 MG: 10 INJECTION, SOLUTION INTRAVENOUS at 05:14

## 2025-01-01 RX ADMIN — HYDROCORTISONE SODIUM SUCCINATE 50 MG: 100 INJECTION, POWDER, FOR SOLUTION INTRAMUSCULAR; INTRAVENOUS at 12:26

## 2025-01-01 RX ADMIN — ACETAMINOPHEN 650 MG: 325 TABLET ORAL at 18:16

## 2025-01-01 RX ADMIN — AMPICILLIN AND SULBACTAM 3 G: 2; 1 INJECTION, POWDER, FOR SOLUTION INTRAMUSCULAR; INTRAVENOUS at 18:14

## 2025-01-01 RX ADMIN — Medication 1.1 MG/HR: at 23:33

## 2025-01-01 ASSESSMENT — PAIN DESCRIPTION - PAIN TYPE
TYPE: ACUTE PAIN

## 2025-03-24 PROBLEM — J96.90 RESPIRATORY FAILURE (HCC): Status: ACTIVE | Noted: 2025-01-01

## 2025-03-24 NOTE — CONSULTS
"Pulmonary Consultation    Date of consult: 3/24/2025    Referring Physician  Evi Calderon M.D.    Reason for Consultation      History of Presenting Illness  Katey is a 19 year old male with down syndrome and obesity admitted with respiratory complaints and hypoxia who was intubated in the emergency room there.  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  He was initially coughing and had a fever but then became hypoxic to the 70's-80's for a couple days prior to bringing him in. He had some diarrhea and upset stomach for a couple days as well. He was uncooperative with NC oxygen and medical treatment. He is on low dose levophed peripherally which I am told is much improved from prior. He has been treated for a left sided pneumonia with vanc and zosyn and this morning developed a complete \"left lung white out\". The transferring physician is unable to tell me if this is plugging, effusion or pneumonia as he states the patient has not been stable enough to get a CT and they do not have POCUS available. He is on the ventilator with a tidal volume of 340, PEEP 10 and FiO2 of 0.70 with a VBG 7.36/44/65 improved from 7.29/56/63/27. The transferring physician does not feel comfortable placing a central line prior to transfer due to difficulty accessing the IJ. Patient has now become anuric.     On arrival Katey was dysynchonous with the ventilator despite about 10mg of versed and several pushes of ketamine in route, a versed gtt and fentanyl gtt.  He arrived on 3.2 mcg/min of levophed.    Code Status  Full Code    Review of Systems  Review of Systems   Unable to perform ROS: Intubated       Past Medical History   has no past medical history on file.    Surgical History   has no past surgical history on file.    Family History  family history is not on file.    Social History       Medications  Home Medications    **Home " medications have not yet been reviewed for this encounter**       Audit from Redirected Encounters    **Home medications have not yet been reviewed for this encounter**       Current Facility-Administered Medications   Medication Dose Route Frequency Provider Last Rate Last Admin    heparin injection 5,000 Units  5,000 Units Subcutaneous Q8HRS Evi Calderon M.D.        acetaminophen (Tylenol) tablet 650 mg  650 mg Oral Q6HRS PRN Evi Calderon M.D.        hydrALAZINE (Apresoline) injection 10 mg  10 mg Intravenous Q4HRS PRN Evi Calderon M.D.        norepinephrine (Levophed) 8 mg in 250 mL NS infusion (premix)  0-1 mcg/kg/min (Ideal) Intravenous Continuous Evi Calderon M.D. 8.2 mL/hr at 03/24/25 1431 0.08 mcg/kg/min at 03/24/25 1431    fentaNYL (Sublimaze) injection 100 mcg  100 mcg Intravenous Q15 MIN PRN Evi Calderon M.D.   100 mcg at 03/24/25 1430    And    fentaNYL (Sublimaze) injection 200 mcg  200 mcg Intravenous Q15 MIN PRN Evi Calderon M.D.        And    fentaNYL (SUBLIMAZE) 50 mcg/mL in 50mL (Continuous Infusion)   Intravenous Continuous Evi Calderon M.D. 2 mL/hr at 03/24/25 1416 100 mcg/hr at 03/24/25 1416    And    propofol (DIPRIVAN) injection  0-80 mcg/kg/min (Ideal) Intravenous Continuous Evi Calderon M.D. 22.9 mL/hr at 03/24/25 1434 70 mcg/kg/min at 03/24/25 1434    Respiratory Therapy Consult   Nebulization Continuous RT Evi Calderon M.D.        famotidine (Pepcid) tablet 20 mg  20 mg Enteral Tube Q12HRS Evi Calderon M.D.        Or    famotidine (Pepcid) injection 20 mg  20 mg Intravenous Q12HRS Evi Calderon M.D.        senna-docusate (Pericolace Or Senokot S) 8.6-50 MG per tablet 2 Tablet  2 Tablet Enteral Tube BID Evi Calderon M.D.        And    polyethylene glycol/lytes (Miralax) Packet 1 Packet  1 Packet Enteral Tube QDAY PRN Evi Calderon M.D.        And    magnesium hydroxide (Milk Of Magnesia) suspension 30 mL  30 mL Enteral Tube QDAY  PRN Evi Calderon M.D.        And    bisacodyl (Dulcolax) suppository 10 mg  10 mg Rectal QDAY PRN Evi Calderon M.D.        MD Alert...ICU Electrolyte Replacement per Pharmacy   Other PHARMACY TO DOSE Evi Calderon M.D.        lidocaine (Xylocaine) 1 % injection 2 mL  2 mL Tracheal Tube Q30 MIN PRN Evi Calderon M.D.           Allergies  No Known Allergies    Vital Signs last 24 hours  Temp:  [37.6 °C (99.6 °F)-38.6 °C (101.5 °F)] 38.6 °C (101.5 °F)  Pulse:  [80-95] 94  Resp:  [16-25] 16  BP: ()/(41-53) 97/51  SpO2:  [89 %-99 %] 99 %    Physical Exam  Physical Exam  Constitutional:       Appearance: He is obese.   HENT:      Head: Atraumatic.   Cardiovascular:      Rate and Rhythm: Normal rate and regular rhythm.   Pulmonary:      Breath sounds: Rhonchi present. No wheezing or rales.   Abdominal:      General: There is distension.      Palpations: Abdomen is soft.   Musculoskeletal:         General: No swelling.   Skin:     General: Skin is warm and dry.   Neurological:      Comments: Intubated and sedated         Fluids    Intake/Output Summary (Last 24 hours) at 3/24/2025 1654  Last data filed at 3/24/2025 1500  Gross per 24 hour   Intake 25.72 ml   Output --   Net 25.72 ml       Laboratory  Recent Results (from the past 48 hours)   proBrain Natriuretic Peptide, NT    Collection Time: 03/24/25  2:20 PM   Result Value Ref Range    NT-proBNP 122 0 - 125 pg/mL   Comp Metabolic Panel    Collection Time: 03/24/25  2:20 PM   Result Value Ref Range    Sodium 138 135 - 145 mmol/L    Potassium 4.6 3.6 - 5.5 mmol/L    Chloride 105 96 - 112 mmol/L    Co2 23 20 - 33 mmol/L    Anion Gap 10.0 7.0 - 16.0    Glucose 82 65 - 99 mg/dL    Bun 18 8 - 22 mg/dL    Creatinine 2.44 (H) 0.50 - 1.40 mg/dL    Calcium 8.5 8.4 - 10.2 mg/dL    Correct Calcium 9.5 8.5 - 10.5 mg/dL    AST(SGOT) 41 12 - 45 U/L    ALT(SGPT) 70 (H) 2 - 50 U/L    Alkaline Phosphatase 56 30 - 99 U/L    Total Bilirubin 0.5 0.1 - 1.5 mg/dL     Albumin 2.8 (L) 3.2 - 4.9 g/dL    Total Protein 6.6 6.0 - 8.2 g/dL    Globulin 3.8 (H) 1.9 - 3.5 g/dL    A-G Ratio 0.7 g/dL   CBC WITH DIFFERENTIAL    Collection Time: 03/24/25  2:20 PM   Result Value Ref Range    WBC 14.5 (H) 4.8 - 10.8 K/uL    RBC 3.26 (L) 4.70 - 6.10 M/uL    Hemoglobin 11.1 (L) 14.0 - 18.0 g/dL    Hematocrit 33.8 (L) 42.0 - 52.0 %    .7 (H) 81.4 - 97.8 fL    MCH 34.0 (H) 27.0 - 33.0 pg    MCHC 32.8 32.3 - 36.5 g/dL    RDW 49.4 35.9 - 50.0 fL    Platelet Count 319 164 - 446 K/uL    MPV 9.6 9.0 - 12.9 fL    Neutrophils-Polys 88.00 (H) 44.00 - 72.00 %    Lymphocytes 6.70 (L) 22.00 - 41.00 %    Monocytes 3.50 0.00 - 13.40 %    Eosinophils 0.30 0.00 - 6.90 %    Basophils 0.10 0.00 - 1.80 %    Immature Granulocytes 1.40 (H) 0.00 - 0.90 %    Nucleated RBC 0.00 0.00 - 0.20 /100 WBC    Neutrophils (Absolute) 12.74 (H) 1.82 - 7.42 K/uL    Lymphs (Absolute) 0.97 (L) 1.00 - 4.80 K/uL    Monos (Absolute) 0.51 0.00 - 0.85 K/uL    Eos (Absolute) 0.05 0.00 - 0.51 K/uL    Baso (Absolute) 0.01 0.00 - 0.12 K/uL    Immature Granulocytes (abs) 0.21 (H) 0.00 - 0.11 K/uL    NRBC (Absolute) 0.00 K/uL   PHOSPHORUS    Collection Time: 03/24/25  2:20 PM   Result Value Ref Range    Phosphorus 4.3 2.5 - 4.5 mg/dL   MAGNESIUM    Collection Time: 03/24/25  2:20 PM   Result Value Ref Range    Magnesium 1.6 1.5 - 2.5 mg/dL   LACTIC ACID    Collection Time: 03/24/25  2:20 PM   Result Value Ref Range    Lactic Acid 1.2 0.5 - 2.0 mmol/L   Triglyceride    Collection Time: 03/24/25  2:20 PM   Result Value Ref Range    Triglycerides 108 0 - 149 mg/dL   ESTIMATED GFR    Collection Time: 03/24/25  2:20 PM   Result Value Ref Range    GFR (CKD-EPI) 38 (A) >60 mL/min/1.73 m 2   POCT arterial blood gas device results    Collection Time: 03/24/25  3:22 PM   Result Value Ref Range    Ph 7.259 (LL) 7.350 - 7.450    Pco2 53.2 (HH) 32.0 - 48.0 mmHg    Po2 72 (L) 83 - 108 mmHg    Tco2 25 (L) 32 - 48 mmol/L    S02 91 (L) 93 - 99 %    Hco3  23.9 21.0 - 28.0 mmol/L    BE -4 -4 - 3 mmol/L    Body Temp 39.7 C degrees    O2 Therapy 90 %    iPF Ratio 80     Ph Temp Jose Alfredo 7.222 (LL) 7.350 - 7.450    Po2 Temp Cor 86 83 - 108 mmHg    Specimen Arterial     Oscar Test Pass     DelSys Vent     Tidal Volume 330 mL    Peep End Expiratory Pressure 12 cmh20    Set Rate 20     Mode APV-CMV    POCT lactate device results    Collection Time: 03/24/25  3:22 PM   Result Value Ref Range    iStat Lactate 0.8 0.5 - 2.0 mmol/L       Imaging  [id number] VIEW   Final Result      NG tube tip overlies the gastric body.      DX-CHEST-PORTABLE (1 VIEW)   Final Result      1.  Interval improvement in aeration of the left lung. There does remain volume loss and consolidation.      2.  Cardiomegaly.      3.  Endotracheal tube tip located just above the level of the clavicles.      DX-OUTSIDE IMAGES-DX CHEST   Final Result      DX-OUTSIDE IMAGES-DX CHEST   Final Result      DX-OUTSIDE IMAGES-DX CHEST   Final Result      DX-OUTSIDE IMAGES-DX CHEST   Final Result      DX-OUTSIDE IMAGES-DX CHEST   Final Result          Assessment/Plan  #LLL consolidation/Pneumonia  #Ventilator dependent respiratory failure  Plan:  - Admit to ICU  - Daily SAT/SBT per protocol  - PRN ABG  - Elevate HOB to 30 degrees  - Chlorhexidine rinse to decrease VAP incidence  - Maintain O2 saturation > 90%   - Tube placement verified  - Pepcid for GI prophylaxis  - Sedation with propofol for RASS +1 to -1  - Monitor triglycerides periodically   - Analgesia with Fentanyl for CPOT < 2  - Restraints to prevent self extubation  - Utilizing lung protective ventilation with tidal volume 6ml/kg  - Monitoring peak and plateau pressures  - CAP coverage with unasyn and azithromycin  - Assess pleural fluid with ultrasound  - MRSA swab  - Biofire  - will consider bronch for BAL  - if able may need a CT of the chest    #Septic Shock  #Sedation related shock  Plan:  - MAP goal > 65, SBP > 90  - Titrate Levophed to MAP goal   -  Fluid resuscitate when able (currently contraindicated due to respiratory status)  - Continue CAP coverage, will also likely cover urine    #Respiratory acidosis  Plan:  - Increased respiratory rate on ventilator  - continue to work towards vent synchrony     #Acute Kidney Injury  Plan:  - Monitor UOP  - Avoid nephrotoxins as able  - Monitor renal function on daily labs    #Macrocytic Anemia  Plan:  - Monitor on daily labs  - Transfuse for hemoglobin < 7  - Folate and B12 supplementation  - Outpatient workup    #Morbid obesity  Plan:  - no active plan while inpatient    #Hypomagnesemia  Plan:  - Monitor on labs  - Replete as needed    Evi Calderon MD RD  Pulmonary and Critical Care    Available on Voalte

## 2025-03-24 NOTE — PROGRESS NOTES
"[facility] DIRECT ADMIT ACCEPTANCE NOTE    Transferring facility: [facility]   Transferring provider: Dr. Roberts    Chief complaint: Respiratory failure  Pertinent history & patient course: Katey is a 19 year old male with down syndrome and obesity admitted with respiratory complaints and hypoxia who was intubated in the emergency room there.  He was uncooperative with NC oxygen and medical treatment.  He is on low dose levophed peripherally which I am told is much improved from prior. He has been treated for a left sided pneumonia with vanc and zosyn and this morning developed a complete \"left lung white out\".  The transferring physician is unable to tell me if this is plugging, effusion or pneumonia as he states the patient has not been stable enough to get a CT and they do not have POCUS available.  He is on the ventilator with a tidal volume of 340, PEEP 10 and FiO2 of 0.70 with a VBG 7.36/44/65 improved from 7.29/56/63/27.  The transferring physician does not feel comfortable placing a central line prior to transfer due to difficulty accessing the IJ.  Patient has now become anuric.   Pertinent imaging & lab results: Normal procal , WBC 10.7, GAP 11, Creatinine 0.86. Viral panel negative.   Consultants called prior to transfer and pertinent input from consultants: none  Code Status: Full per transferring provider, I personally verified with the transferring provider patient's code status and the transferring provider has confirmed this with the patient.  Reason for Transfer: HLOC. Present facility is reportedly unable to line patient, diagnose cause of respiratory failure or obtain an ABG.  This is an emergent transfer for the treatment of a young man in severe respiratory failure.   Further work up or recommendations requested prior to transfer: none    Patient accepted for transfer: Yes  Accepting [facility] Facility: [facility] - Nursing to notify the admitting provider when patient " arrives to the unit.    Consultants to be called upon arrival: none  Admission status: Inpatient.   Floor requested: ICU    The admitting provider is the point of contact for questions or concerns regarding the patient's care.    Evi Calderon MD RD  Pulmonary and Critical Care    Available on Voalte

## 2025-03-24 NOTE — PROGRESS NOTES
4 Eyes Skin Assessment Completed by JIMMY Borges and JIMMY Mckeon.    Head WDL  Ears WDL  Nose WDL  Mouth WDL  Neck WDL  Breast/Chest WDL  Shoulder Blades WDL  Spine WDL  (R) Arm/Elbow/Hand WDL  (L) Arm/Elbow/Hand WDL  Abdomen WDL  Groin Scab  Scrotum/Coccyx/Buttocks Redness and Blanching  (R) Leg WDL  (L) Leg WDL  (R) Heel/Foot/Toe WDL  (L) Heel/Foot/Toe WDL          Devices In Places ECG, Blood Pressure Cuff, Pulse Ox, Thomas, SCD's, ET Tube, and OG/NG      Interventions In Place TAP System, Pillows, Q2 Turns, Low Air Loss Mattress, Barrier Cream, and Heels Loaded W/Pillows    Possible Skin Injury No    Pictures Uploaded Into Epic No, needs to be completed  Wound Consult Placed N/A  RN Wound Prevention Protocol Ordered No

## 2025-03-25 NOTE — CARE PLAN
"The patient is Watcher - Medium risk of patient condition declining or worsening    Shift Goals  Clinical Goals: RASS -4 per MD for vent compliance  Patient Goals: MIREILLE  Family Goals: \"Take plastic off under pt\"    Progress made toward(s) clinical / shift goals:    Problem: Pain - Standard  Goal: Alleviation of pain or a reduction in pain to the patient’s comfort goal  Outcome: Progressing     Problem: Safety - Medical Restraint  Goal: Remains free of injury from restraints (Restraint for Interference with Medical Device)  Outcome: Progressing     Problem: Skin Integrity  Goal: Skin integrity is maintained or improved  Outcome: Progressing     Problem: Fall Risk  Goal: Patient will remain free from falls  Outcome: Progressing       Patient is not progressing towards the following goals:      "

## 2025-03-25 NOTE — PROGRESS NOTES
12-hour chart check complete.    Monitor Summary  Rhythm: SR  Rate: 92-98  Ectopy: n/a  Measurements: 0.14/0.08/0.44

## 2025-03-25 NOTE — CARE PLAN
Pt. Is on CMV 24, vt 330 cc, Peep 14, Fio2 100%.    Q4 mycomyst 3-5 cc of 20%      Problem: Bronchopulmonary Hygiene  Goal: Increase mobilization of retained secretions  Description: Target End Date:  2 to 3 days1.  Perform bronchopulmonary therapy as indicated by assessment2.  Perform airway suctioning3.  Perform actions to maintain patient airway  Outcome: Not Progressing     Problem: Ventilation  Goal: Ability to achieve and maintain unassisted ventilation or tolerate decreased levels of ventilator support  Description: Target End Date:  4 days Document on Vent flowsheet1.  Support and monitor invasive and noninvasive mechanical ventilation2.  Monitor ventilator weaning response3.  Perform ventilator associated pneumonia prevention interventions4.  Manage ventilation therapy by monitoring diagnostic test results  Outcome: Not Progressing

## 2025-03-25 NOTE — PROGRESS NOTES
"Pulmonary & Critical Care Progress Note    Date of admission  3/24/2025    Chief Complaint  19 y.o. male admitted 3/24/2025 with respiratory failure    Hospital Course  Katey is a 19 year old male with down syndrome and obesity admitted with respiratory complaints and hypoxia who was intubated in the emergency room there.  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  He was initially coughing and had a fever but then became hypoxic to the 70's-80's for a couple days prior to bringing him in. He had some diarrhea and upset stomach for a couple days as well. He was uncooperative with NC oxygen and medical treatment. He is on low dose levophed peripherally which I am told is much improved from prior. He has been treated for a left sided pneumonia with vanc and zosyn and this morning developed a complete \"left lung white out\". The transferring physician is unable to tell me if this is plugging, effusion or pneumonia as he states the patient has not been stable enough to get a CT and they do not have POCUS available. He is on the ventilator with a tidal volume of 340, PEEP 10 and FiO2 of 0.70 with a VBG 7.36/44/65 improved from 7.29/56/63/27. The transferring physician does not feel comfortable placing a central line prior to transfer due to difficulty accessing the IJ. Patient has now become anuric.      On arrival Katey was dysynchonous with the ventilator despite about 10mg of versed and several pushes of ketamine in route, a versed gtt and fentanyl gtt.  He arrived on 3.2 mcg/min of levophed.    Interval Problem Update  Reviewed last 24 hour events:  ICU CHECKLIST:  Chart review from the past 24 hours includes imaging, laboratory studies, vital signs and notes available.  Pertinent system review for today's visit includes:  Significant overnight events:     Neuro: Sedated, RASS -4  Cardiac: 's, BP /40-50 on " levophed at 1.5mcg/kg/min  Pulmonary: APVC 28/330/14/100% -> 7.23/49/75/21 P:F 75  Heme: Unchanged.  + Leukocytosis    DVT Prophylaxis: Heparin  /Renal: Worsening YUKO, minimal urine output overnight   I/O: 1623/20 = 1603 = +1603   Thomas: +  ID:  Febrile to 103.3, MRSA negative, blood and respiratory cultures pending, PCR pending. Group A Strep Positive   Skin: Intact  GI/Nutrition: NPO   Prophylaxis: Pepcid  Endo: No active issues  Additional data:   Lines/Tubes: PIV, Right Fem HD cath      Review of Systems  Review of Systems   Unable to perform ROS: Intubated        Vital Signs for last 24 hours   Temp:  [37.4 °C (99.3 °F)-39.6 °C (103.3 °F)] 37.4 °C (99.3 °F)  Pulse:  [] 101  Resp:  [14-35] 28  BP: ()/(41-57) 105/50  SpO2:  [89 %-97 %] 95 %    Hemodynamic parameters for last 24 hours       Respiratory Information for the last 24 hours  Vent Mode: APVCMV  Rate (breaths/min): 28  Vt Target (mL): 330  PEEP/CPAP: 14  MAP: 21  Control VTE (exp VT): 325    Physical Exam   Physical Exam  Constitutional:       Comments: Deep sedation, RASS -4   HENT:      Mouth/Throat:      Mouth: Mucous membranes are moist.   Cardiovascular:      Rate and Rhythm: Normal rate and regular rhythm.   Pulmonary:      Breath sounds: Rhonchi present. No wheezing or rales.   Abdominal:      General: There is no distension.      Palpations: Abdomen is soft.   Musculoskeletal:         General: No swelling or deformity.   Skin:     General: Skin is warm and dry.   Neurological:      Comments: Sedated to RASS - 5         Medications  Current Facility-Administered Medications   Medication Dose Route Frequency Provider Last Rate Last Admin    heparin injection 5,000 Units  5,000 Units Subcutaneous Q8HRS Evi Calderon M.D.   5,000 Units at 03/25/25 0605    acetaminophen (Tylenol) tablet 650 mg  650 mg Oral Q6HRS PRN Evi Calderon M.D.   650 mg at 03/25/25 0147    hydrALAZINE (Apresoline) injection 10 mg  10 mg Intravenous Q4HRS  PRN Evi Calderon M.D.        norepinephrine (Levophed) 8 mg in 250 mL NS infusion (premix)  0-1 mcg/kg/min (Ideal) Intravenous Continuous Evi Calderon M.D. 15.4 mL/hr at 03/24/25 2245 0.15 mcg/kg/min at 03/24/25 2245    fentaNYL (Sublimaze) injection 100 mcg  100 mcg Intravenous Q15 MIN PRN Evi Calderon M.D.   100 mcg at 03/24/25 1430    And    fentaNYL (Sublimaze) injection 200 mcg  200 mcg Intravenous Q15 MIN PRN Evi Calderon M.D.        And    fentaNYL (SUBLIMAZE) 50 mcg/mL in 50mL (Continuous Infusion)   Intravenous Continuous Evi Calderon M.D. 4 mL/hr at 03/24/25 2041 200 mcg/hr at 03/24/25 2041    And    propofol (DIPRIVAN) injection  0-80 mcg/kg/min (Ideal) Intravenous Continuous Evi Calderon M.D. 18 mL/hr at 03/25/25 0450 55 mcg/kg/min at 03/25/25 0450    Respiratory Therapy Consult   Nebulization Continuous RT Evi Calderon M.D.        famotidine (Pepcid) tablet 20 mg  20 mg Enteral Tube Q12HRS Evi Calderon M.D.   20 mg at 03/24/25 1817    Or    famotidine (Pepcid) injection 20 mg  20 mg Intravenous Q12HRS Evi Calderon M.D.   20 mg at 03/25/25 0605    senna-docusate (Pericolace Or Senokot S) 8.6-50 MG per tablet 2 Tablet  2 Tablet Enteral Tube BID Evi Calderon M.D.   2 Tablet at 03/25/25 0605    And    polyethylene glycol/lytes (Miralax) Packet 1 Packet  1 Packet Enteral Tube QDAY PRN Evi Calderon M.D.        And    magnesium hydroxide (Milk Of Magnesia) suspension 30 mL  30 mL Enteral Tube QDAY PRN Evi Calderon M.D.        And    bisacodyl (Dulcolax) suppository 10 mg  10 mg Rectal QDAY PRN Evi Calderon M.D.        lidocaine (Xylocaine) 1 % injection 2 mL  2 mL Tracheal Tube Q30 MIN PRN Evi Calderon M.D.        ampicillin/sulbactam (Unasyn) 3 g in  mL IVPB  3 g Intravenous Q6HRS Evi Calderon M.D. 200 mL/hr at 03/25/25 0603 3 g at 03/25/25 0603    azithromycin (Zithromax) 500 mg in dextrose 5% 250 mL IVPB  500 mg Intravenous  Q24HRS Evi Calderon M.D. 250 mL/hr at 03/25/25 0638 500 mg at 03/25/25 0638    acetylcysteine (Mucomyst) 20 % solution 3-5 mL  3-5 mL Inhalation Q4HRS (RT) CÉSAR MccallumORima   4 mL at 03/25/25 0216    ipratropium-albuterol (DUONEB) nebulizer solution  3 mL Nebulization Q4H PRN (RT) CÉSAR MccallumO.   3 mL at 03/25/25 0246       Fluids    Intake/Output Summary (Last 24 hours) at 3/25/2025 0639  Last data filed at 3/25/2025 0600  Gross per 24 hour   Intake 1623.11 ml   Output 20 ml   Net 1603.11 ml       Laboratory  Recent Labs     03/24/25  1522 03/25/25  0454   ISTATAPH 7.259* 7.239*   ISTATAPCO2 53.2* 49.6*   ISTATAPO2 72* 75*   ISTATATCO2 25* 23*   XXEIWIJ1MYS 91* 92*   ISTATARTHCO3 23.9 21.2   ISTATARTBE -4 -6*   ISTATTEMP 39.7 C 37.4 C   ISTATFIO2 90 100   ISTATSPEC Arterial Arterial   ISTATAPHTC 7.222* 7.233*   ARNEYVFX6CO 86 77*         Recent Labs     03/24/25  1420 03/25/25  0345   SODIUM 138 138   POTASSIUM 4.6 4.0   CHLORIDE 105 104   CO2 23 18*   BUN 18 23*   CREATININE 2.44* 4.32*   MAGNESIUM 1.6 2.0   PHOSPHORUS 4.3 5.0*   CALCIUM 8.5 8.7     Recent Labs     03/24/25  1420 03/25/25  0345   ALTSGPT 70* 67*   ASTSGOT 41 45   ALKPHOSPHAT 56 60   TBILIRUBIN 0.5 0.7   GLUCOSE 82 77     Recent Labs     03/24/25  1420 03/25/25  0345   WBC 14.5* 15.3*   NEUTSPOLYS 88.00*  --    LYMPHOCYTES 6.70*  --    MONOCYTES 3.50  --    EOSINOPHILS 0.30  --    BASOPHILS 0.10  --    ASTSGOT 41 45   ALTSGPT 70* 67*   ALKPHOSPHAT 56 60   TBILIRUBIN 0.5 0.7     Recent Labs     03/24/25  1420 03/25/25  0345   RBC 3.26* 3.35*   HEMOGLOBIN 11.1* 11.3*   HEMATOCRIT 33.8* 35.0*   PLATELETCT 319 363       Imaging  X-Ray:  I have personally reviewed the images and compared with prior images.    Assessment/Plan  #LLL consolidation/Pneumonia  #Ventilator dependent respiratory failure  #Potential ARDS: P:F 75 (PaO2 75 on 100% FiO2), increased infiltrates, now bilateral, Patient is not clinically fluid overloaded  however is anuric.   - Differential includes pulmonary edema 2/2 renal failure  Plan:  - Admit to ICU  - Daily SAT/SBT per protocol  - PRN ABG  - Elevate HOB to 30 degrees  - Chlorhexidine rinse to decrease VAP incidence  - Maintain O2 saturation > 90%   - Tube placement verified  - Pepcid for GI prophylaxis  - Sedation with propofol for RASS -5  - Monitor triglycerides periodically   - Analgesia with Fentanyl for CPOT < 2  - Restraints to prevent self extubation  - Utilizing lung protective ventilation with tidal volume 6ml/kg  - Monitoring peak and plateau pressures  - CAP coverage with unasyn and azithromycin  - Assess pleural fluid with ultrasound  - MRSA swab  - Biofire pending  - Bronch BAL pending  - CTA Chest to rule out PE due to severe hypoxia and shock  - Start Paralytic today      #Group A Strep  Plan:  - CT neck with contrast    #Septic Shock  #Sedation related shock  Plan:  - MAP goal > 65, SBP > 90  - Titrate Levophed to MAP goal   - Fluid resuscitate when able (currently contraindicated due to respiratory status)  - Continue CAP coverage, will also likely cover urine     #Respiratory acidosis  Plan:  - Increased respiratory rate on ventilator  - continue to work towards vent synchrony      #Acute Kidney Injury  #Urosepsis  Plan:  - Monitor UOP  - Avoid nephrotoxins as able  - Monitor renal function on daily labs  - Consult nephrology  - Plan for HD catheter placement  - Likely will need HD for fluid removal  - CT abdomen and pelvis     #Macrocytic Anemia  Plan:  - Monitor on daily labs  - Transfuse for hemoglobin < 7  - Folate and B12 supplementation  - Outpatient workup     #Morbid obesity  Plan:  - no active plan while inpatient     #Hypomagnesemia  Plan:  - Monitor on labs  - Replete as needed    VTE:  Heparin  Ulcer: H2 Antagonist  Lines: Thomas Catheter  Ongoing indication addressed    I have performed a physical exam and reviewed and updated ROS and Plan today (3/25/2025). In review of  yesterday's note (3/24/2025), there are no changes except as documented above.     Discussed patient condition and risk of morbidity and/or mortality with RN, RT, and Pharmacy  The patient remains critically ill.  Critical care time = 90 minutes in directly providing and coordinating critical care and extensive data review.  No time overlap and excludes procedures.    Evi Calderon MD RD  Pulmonary and Critical Care    Available on Voalte

## 2025-03-25 NOTE — DIETARY
NUTRITION SERVICES: BMI - Pt with BMI >40 (=Body mass index is 48.27 kg/m².), morbid obesity. Weight loss counseling not appropriate in acute care setting.     RECOMMEND - If appropriate at DC please refer to outpatient nutrition services for weight management.

## 2025-03-25 NOTE — CONSULTS
DATE OF SERVICE:  03/25/2025     NEPHROLOGY CONSULTATION     REQUESTING PHYSICIAN:  Evi Calderon MD     REASON FOR CONSULTATION:  To evaluate patient with acute kidney injury,   hypervolemia.     HISTORY OF PRESENT ILLNESS:  The patient is a 19-year-old male with past   medical history significant for down syndrome, who was admitted to the   hospital on 03/24/2025 with worsening shortness of breath, fever, respiratory   failure, required intubation on mechanical ventilation.  According to his   mother also had diarrhea, nausea, poor p.o. intake, diagnosed with pneumonia.    Upon admission, creatinine level 2.44, worsened up to 4.32, remains anuric,   scheduled emergent dialysis.     REVIEW OF SYSTEMS:  Not possible to obtain, the patient is intubated.     PAST MEDICAL HISTORY:  Down syndrome.     PAST SURGICAL HISTORY:  Not able to obtain as the patient is intubated.     FAMILY HISTORY:  Also not possible to obtain.     SOCIAL HISTORY:  No tobacco, alcohol or drug use.     ALLERGIES:  No known drug allergies.     MEDICATIONS:  Outpatient medications reviewed.     PHYSICAL EXAMINATION:  VITAL SIGNS:  Blood pressure 99/52, heart rate 105, temperature 37.3 Celsius.  GENERAL APPEARANCE:  Well-developed, short stature male, ____ intubated, on   mechanical ventilation.  HEENT:  Normocephalic, atraumatic.  Pupils equal, round, reactive to light.    Endotracheal tube in place.  NECK:  No lymphadenopathy, no thyromegaly appreciated.  LUNGS:  Coarse breath sounds, decreased breath sounds at bases.  No wheezes,   no rhonchi.  HEART:  Regular rhythm, tachycardic.  No rub or gallop.  ABDOMEN:  Soft, slightly distended.  Bowel sounds present.  No palpable mass.  EXTREMITIES:  Trace pedal edema.  No cyanosis.  NEUROLOGIC:  The patient is sedated.  SKIN:  Warm, dry.  No erythema or rash.     LABORATORY DATA:  Laboratory results reviewed, revealed hemoglobin level 11.3.    Sodium 138, potassium 4.0, CO2 of 18, BUN 23 and  creatinine level 4.32.     URINALYSIS:  Trace ketones, large occult blood, trace protein, white blood   count 11-20, red blood count 21-50.     ASSESSMENT AND PLAN:  The patient is a 19-year-old male admitted with   pneumonia and respiratory distress, possible ARDS, intubated on mechanical   ventilation with worsening acute kidney injury, anuric.  1.  Acute kidney injury secondary to acute tubular necrosis, anuric, to start   hemodialysis.  Continue daily.  2.  Electrolytes, remain well controlled.  3.  Metabolic acidosis, will be correcting with hemodialysis.  4.  Volume overloaded.  To attempt ultrafiltration with hemodialysis as   tolerates.  5.  Hypertension.  Low blood pressure, on pressors.  Keep mean arterial   pressure above 65.     RECOMMENDATIONS:  1.  Emergent hemodialysis today.  Continue daily.  To monitor daily CBC, basic   metabolic panel.  Avoid nephrotoxic agents like IV contrast.  2.  To adjust all medications to renal doses.  We will follow the patient   closely.     Thank you for the consult.        ______________________________  MD JOSUE ROGERS/KAY    DD:  03/25/2025 09:49  DT:  03/25/2025 10:28    Job#:  [id number]

## 2025-03-25 NOTE — PROGRESS NOTES
[facility] Progress Note       HD #1 ordered by Dr. Lisa. Treatment started at 1140 and ended at 1410. Consent signed by mother prior to start of tx.     Total Net UF: 1500mL.     Pt tolerated treatment well. Pressor titrated by primary RN throughout dialysis for B/P support. See flow sheet for details.     CVC patent, locked with Heparin 1:1000 units; 1.4 mL to RED port and 1.4 mL to BLUE port post dialysis. No s/s of infection present. Dressing in place, CDI.     Report given to JIMMY Weller.

## 2025-03-25 NOTE — PROGRESS NOTES
12-hour chart check complete.    Monitor Summary  Rhythm: SR-ST  Rate:   Ectopy: N/A  Measurements: .14/.06/.32

## 2025-03-25 NOTE — PROGRESS NOTES
2 RN Skin Assessment Completed by Beulah RN and Nithin RN.    Head: WDL  Ears: WDL  Nose: WDL  Mouth: WDL  Neck: WDL  Breasts/Chest: WDL  Shoulder Blades: WDL  Spine: WDL  (R) Arm/Elbow/hand: WDL  (L) Arm/Elbow/hand: WDL  Abdomen:WDL  Groin: WDL  Sacrum/Coccyx/Buttocks: redness and blanching  (R) Leg: WDL  (L) Leg: WDL  (R) Heel/Foot/Toe: WDL  (L) Heel/Foot/Toe: WDL          Devices in place: ECG, BP Cuff, Pulse Ox, SCD's, and ET tube    Interventions in place: TAP system, Pillows, Q2 turns, Low air loss mattress , Barrier Cream, and Heels floated with pillows    Possible skin injury found: No    Pictures uploaded into Epic: N/A  Wound Consult Placed: N/A

## 2025-03-25 NOTE — PROGRESS NOTES
12-hour chart check complete.    Monitor Summary  Rhythm: sr  Rate: 89  Ectopy: na  Measurements: 0.167/0.082/0.326

## 2025-03-25 NOTE — CARE PLAN
"The patient is Watcher - Medium risk of patient condition declining or worsening    Shift Goals  Clinical Goals: RASS -4 per MD for vent compliance  Patient Goals: MIREILLE  Family Goals: \"Take plastic off under pt\"    Progress made toward(s) clinical / shift goals:    Problem: Pain - Standard  Goal: Alleviation of pain or a reduction in pain to the patient’s comfort goal  Outcome: Progressing     Problem: Safety - Medical Restraint  Goal: Remains free of injury from restraints (Restraint for Interference with Medical Device)  Outcome: Progressing       Patient is not progressing towards the following goals:      Problem: Urinary Elimination  Goal: Establish and maintain regular urinary output  Outcome: Not Progressing   500 cc bolus given at 1843 and pandya flushed with little urine output. Bladder scan unremarkable. MD made aware  "

## 2025-03-25 NOTE — PROCEDURES
HEMODIALYSIS CATHETER INSERTION PROCEDURE     Central Line Insertion    Date/Time: 3/25/2025 10:47 AM    Performed by: Evi Calderon M.D.  Authorized by: Evi Calderon M.D.    Consent:     Consent obtained:  Written    Consent given by:  Parent    Risks discussed:  Bleeding and infection    Alternatives discussed:  No treatment  Pre-procedure details:     Hand hygiene: Hand hygiene performed prior to insertion      Sterile barrier technique: All elements of maximal sterile technique followed      Skin preparation:  2% chlorhexidine    Skin preparation agent: Skin preparation agent completely dried prior to procedure    Sedation:     Sedation type:  Deep  Anesthesia:     Anesthesia method:  Local infiltration    Local anesthetic:  Lidocaine 1% w/o epi  Procedure details:     Location:  R femoral    Patient position:  Trendelenburg    Procedural supplies:  Triple lumen    Catheter size: 13.    Landmarks identified: yes      Ultrasound guidance: yes      Sterile ultrasound techniques: Sterile gel and sterile probe covers were used      Number of attempts:  1    Successful placement: yes    Post-procedure details:     Post-procedure:  Dressing applied and line sutured    Guidewire: guidewire removal confirmed      Assessment:  Blood return through all ports and free fluid flow    Patient tolerance of procedure:  Tolerated well, no immediate complications  Comments:      Evi Calderon MD RD  Pulmonary and Critical Care    Available on Voalte

## 2025-03-25 NOTE — PROCEDURES
Bronchoscopy Procedure Note      Results/Findings: Purulent secretions in the left mainstem bronchus and left lower lobe bronchus    Specimen: BAL left lower lobe        Location: [facility]    Date of Operation: 3/24/2025     Attending Physician Performing Procedure: Vidya Louie D.O.     Pre-op Diagnosis: Concern for mucous plugging    Post-op Diagnosis: Pneumonia, purulent secretions      Operation:   Diagnostic flexible fiberoptic bronchoscopy, with bronchoalveolar lavage    Estimated Blood Loss: None hypoxemia,    Complications: Had to terminate the bronchoscopy due to hypoxemia    Indications and History:    The risks, benefits, complications, treatment options and expected outcomes were discussed with the patient's mother. The possibilities of reaction to medication, pulmonary aspiration, bleeding, failure to diagnose a condition and creating a complication requiring transfusion or operation were discussed with the patient who freely signed the consent.    Description of Procedure:    The patient was identified as Katey Persaud and a Time Out was held and the above information confirmed.  The patient was already ventilated and sedated, but was given an additional 5 mg of IV Versed.  The bronchoscope was passed through the ETT. The scope was then passed into the trachea.  Careful inspection of the tracheal lumen was accomplished. The scope was sequentially passed into the left main and then left upper and lower bronchi and segmental bronchi. The right side was not investigated due to hypoxemia    Findings:    Trachea: normal bronchial mucosa  Marion: normal bronchial mucosa  Right main bronchus: Not viewed  Right upper lobe bronchus: Not viewed  Right middle lobe bronchus: Not viewed  Right lower lobe bronchus: Not viewed  Left main bronchus: normal bronchial mucosa, filled with pus  Left upper lobe bronchus: normal bronchial mucosa, filled with pus  Left lower lobe bronchus:  normal bronchial mucosa, filled with pus    Bronchoalveolar lavage was subsequently performed in the left lower lobe. Lavage specimen was and purulent in gross appearance.    __________  This note was generated using voice recognition software which has a chance of producing errors of grammar and content.  I have made every reasonable attempt to find and correct any errors, but it should be expected that some may not be found prior to finalization of this note.    Vidya Louie, DO   Pulmonary and Critical Care

## 2025-03-26 NOTE — CARE PLAN
The patient is Watcher - Medium risk of patient condition declining or worsening    Shift Goals  Clinical Goals: RASS goal -5, SBP>90, MAP>65  Patient Goals: MIREILLE  Family Goals: Updates    Progress made toward(s) clinical / shift goals:    Problem: Pain - Standard  Goal: Alleviation of pain or a reduction in pain to the patient’s comfort goal  Outcome: Progressing     Problem: Safety - Medical Restraint  Goal: Remains free of injury from restraints (Restraint for Interference with Medical Device)  Outcome: Progressing     Problem: Skin Integrity  Goal: Skin integrity is maintained or improved  Outcome: Progressing     Problem: Fall Risk  Goal: Patient will remain free from falls  Outcome: Progressing       Patient is not progressing towards the following goals:

## 2025-03-26 NOTE — PROGRESS NOTES
12-hour chart check complete.    Monitor Summary  Rhythm: SR  Rate: 80's-90's  Ectopy: rare PVC  Measurements: 0.12/0.08/0.40

## 2025-03-26 NOTE — PROGRESS NOTES
ISOLATION PRECAUTIONS EDUCATION    Educated PATIENT, FAMILY, S.O: family member on isolation for Parainfluenza 3 and Mycoplasma pneumoniae.    Educated on reason for isolation, how the infection may be transmitted, and how to help prevent transmission to others. Educated precautions involves staff and visitors wearing PPE, following Standard Precautions and performing meticulous hand hygiene in order to prevent transmission of infection.     Contact Precautions: Educated that Contact Precautions involves staff and visitors wearing gowns and gloves when in the patient room.     In addition, educated that the patient may leave the room, but prior to exiting the patient room each time, the patient needs to have on a fresh patient gown, ensure the potentially infectious area is covered, and perform hand hygiene with soap and water or alcohol-based hand rub, immediately prior to exiting the room.    Droplet Precautions: Educated that Droplet Precautions involves staff and visitors wearing PPE to include a surgical mask when in the patient room.     In addition, educated that they may leave their room, but prior to exiting the patient room each time, the patient needs to have on a fresh patient gown, a surgical mask must be worn by the patient while out of the patient room, and perform hand hygiene immediately prior to exiting the room.     Patient transport and mobilization on unit  Educated that they may leave their room, but prior to exiting, the patient needs to have on a fresh patient gown, ensure the potentially infectious area is covered, performing appropriate hand hygiene immediately prior to exiting the room.

## 2025-03-26 NOTE — PROGRESS NOTES
4 Eyes Skin Assessment Completed by JIMMY Carolina and JIMMY Magana.    Head WDL  Ears WDL  Nose WDL  Mouth WDL  Neck WDL  Breast/Chest WDL  Shoulder Blades WDL  Spine WDL  (R) Arm/Elbow/Hand WDL  (L) Arm/Elbow/Hand WDL  Abdomen Stretch marks  Groin Redness  Scrotum/Coccyx/Buttocks Redness and Blanching  (R) Leg Scratch on upper thigh  (L) Leg WDL  (R) Heel/Foot/Toe WDL  (L) Heel/Foot/Toe WDL          Devices In Places ECG, Blood Pressure Cuff, Pulse Ox, Thomas, SCD's, ET Tube, and Central Line      Interventions In Place TAP System, Pillows, Q2 Turns, and Low Air Loss Mattress    Possible Skin Injury No    Pictures Uploaded Into Epic N/A  Wound Consult Placed N/A  RN Wound Prevention Protocol Ordered No

## 2025-03-26 NOTE — PROGRESS NOTES
[facility] Services Progress Note         HD # 2 today x 3 hours per Dr. Lisa.  Tx initiated at 1009 and ended at 1310    Pt is sedated and intubated, not in distress, with diminished BS, and is febrile at 38C. Pt is also on levophed for blood pressure support. Right femoral catheter is intact although the red port is sluggish; lines were reversed for HD treatment and Dr. Lisa was informed. (+) consent     NET UF: 2000 ml    Tx tolerated. Levophed was titrated by the primary RN to keep SBP >90 mmHg. Blood returned, ports flushed with NS. Heparin 1000 units/ml used to lock catheter per designated amount. CVC ports clamped and capped. Aspirate heparin prior to next CVC use. See eflow sheets for further details.    Report given to MICHELLE Chakraborty RN

## 2025-03-26 NOTE — CARE PLAN
Bipap/Cpap mask placed. yes  Can the patient demonstrate removal of mask? yes  If no, please notify physician.    Bipap BUR 18, PIP 16, 6/10 40%      Problem: Ventilation  Goal: Ability to achieve and maintain unassisted ventilation or tolerate decreased levels of ventilator support  Description: Target End Date:  4 days Document on Vent flowsheet1.  Support and monitor invasive and noninvasive mechanical ventilation2.  Monitor ventilator weaning response3.  Perform ventilator associated pneumonia prevention interventions4.  Manage ventilation therapy by monitoring diagnostic test results  Outcome: Progressing

## 2025-03-26 NOTE — CARE PLAN
CMV 28, vt 330cc, peep 14, Fio2 55%      Problem: Ventilation  Goal: Ability to achieve and maintain unassisted ventilation or tolerate decreased levels of ventilator support  Description: Target End Date:  4 days Document on Vent flowsheet1.  Support and monitor invasive and noninvasive mechanical ventilation2.  Monitor ventilator weaning response3.  Perform ventilator associated pneumonia prevention interventions4.  Manage ventilation therapy by monitoring diagnostic test results  3/26/2025 0257 by Maico Villagomez, RRT  Outcome: Not Progressing  3/26/2025 0254 by Maico Villagomez, RRT  Outcome: Progressing     Problem: Bronchopulmonary Hygiene  Goal: Increase mobilization of retained secretions  Description: Target End Date:  2 to 3 days1.  Perform bronchopulmonary therapy as indicated by assessment2.  Perform airway suctioning3.  Perform actions to maintain patient airway  Outcome: Progressing

## 2025-03-26 NOTE — CARE PLAN
The patient is Watcher - Medium risk of patient condition declining or worsening    Shift Goals  Clinical Goals: RASS goal -5, compliance with vent  Patient Goals: MIREILLE  Family Goals: Updates    Progress made toward(s) clinical / shift goals:    Problem: Pain - Standard  Goal: Alleviation of pain or a reduction in pain to the patient’s comfort goal  Outcome: Progressing     Problem: Safety - Medical Restraint  Goal: Remains free of injury from restraints (Restraint for Interference with Medical Device)  Outcome: Progressing     Problem: Respiratory  Goal: Patient will achieve/maintain optimum respiratory ventilation and gas exchange  Outcome: Progressing       Patient is not progressing towards the following goals:

## 2025-03-26 NOTE — PROGRESS NOTES
12-hour chart check complete.    Monitor Summary  Rhythm: SR-ST  Rate:   Ectopy: n/a  Measurements: 0.14/0.08/0.41

## 2025-03-26 NOTE — DIETARY
"Nutrition Services: Initial Assessment     Day 2 of admit. Katey Persaud is 19 y.o., male with admitting DX of Respiratory failure ([facility]) [J96.90].    Consult Received for: EN    Current Hospital Problems List:    LLL consolidation/Pneumonia   Vent dependent respiratory failure  Group A strep  Parainfluenza virus  Mycoplasma   Septic shock  Respiratory acidosis  Acute kidney injury  Urosepsis  Macrocytic anemia  Morbid obesity  Hypomagnesemia        Nutrition Assessment:      Height: 157.5 cm (5' 2\")  Weight: 120 kg (263 lb 14.3 oz)  Weight taken via: Bed Scale  BMI Calculated: 48.27  BMI Classification: Morbid Obesity   Ideal body weight (IBW): 53.6 kg(118 lb)    Calculation Equation:   MSJ: REE=2095 x 1.1 =2305 kcals  REE x 1.3 = 2724    Estimated needs for critically ill obese:   Total Calories / day: 1320   - 1680 (Calories / k   - 14)   Total Grams Protein / day: >/= 107 (Grams Protein / kg IBW: >/=2.0)    Objective:   Pertinent Medical Hx: Down syndrome  Indication for Nutrition Support: Intubation  Enteral Access:   Enteral Tube 25 Orogastric Oral (Active)   Pertinent Labs: 3/26/25: sodium=134, potassium=4.4 Bun=23, creatinine=5.64, GFR=14, phos=5.3, triglycerides=265  Pertinent Meds: Unasyn, Vibramycin, Pepcid, senna-docusate, Versed, Levo @ 0.24 mcg/kg/min, prop at 80 mcg/kg/min (26.2 mL/hour) providing 692 kcals/day. Per nurse, plan is to stop propofol for Versed.    Last BM:      (pta)   Formula based on RD Eval:  Novasource Renal . Pt is on daily dialysis. Creatinine is high and trending up today. Phos is also high. This formula is appropriate for this pt with YUKO. Pt is on pressors. TF is fiber free.      Current Diet Order/Intake:   NPO      Nutrition Diagnosis:      Swallowing Difficulty related to intubation and sedation as evidenced by NPO w/ plan to start TF.      Nutrition Interventions:      Initiate Novasource Renal at 25 ml/hr continuous and advance per protocol to goal rate " of 50 ml/hr.  Goal tube feed volume provides 2400 kcals, 109 g protein, and 864 ml free water daily.   Patient aware of active plan of care as appropriate.       Nutrition Monitoring and Evaluation:      Monitor nutrition POC.  Additional fluids per MD/DO  Monitor vital signs pertinent to nutrition.    RD following and will provide updated recommendations as indicated.      Kaylene Krishnan R.D.                                         [facility] CRITERIA FOR MALNUTRITION

## 2025-03-26 NOTE — PROCEDURES
Nephrology/Hemodialysis note    Patient admitted with PNA, shock, YUKO - initiated RRT  Seen and examined during dialysis  anuric  Tolerates well with pressor support  UF 2 L  Lab results reviewed  Please see dialysis flow sheet for details

## 2025-03-26 NOTE — PROGRESS NOTES
"Pulmonary & Critical Care Progress Note    Date of admission  3/24/2025    Chief Complaint  19 y.o. male admitted 3/24/2025 with respiratory failure    Hospital Course  Katey is a 19 year old male with down syndrome and obesity admitted with respiratory complaints and hypoxia who was intubated in the emergency room there.  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  He was initially coughing and had a fever but then became hypoxic to the 70's-80's for a couple days prior to bringing him in. He had some diarrhea and upset stomach for a couple days as well. He was uncooperative with NC oxygen and medical treatment. He is on low dose levophed peripherally which I am told is much improved from prior. He has been treated for a left sided pneumonia with vanc and zosyn and this morning developed a complete \"left lung white out\". The transferring physician is unable to tell me if this is plugging, effusion or pneumonia as he states the patient has not been stable enough to get a CT and they do not have POCUS available. He is on the ventilator with a tidal volume of 340, PEEP 10 and FiO2 of 0.70 with a VBG 7.36/44/65 improved from 7.29/56/63/27. The transferring physician does not feel comfortable placing a central line prior to transfer due to difficulty accessing the IJ. Patient has now become anuric.      On arrival Katey was dysynchonous with the ventilator despite about 10mg of versed and several pushes of ketamine in route, a versed gtt and fentanyl gtt.  He arrived on 3.2 mcg/min of levophed.    Interval Problem Update  Reviewed last 24 hour events:  ICU CHECKLIST:  Chart review from the past 24 hours includes imaging, laboratory studies, vital signs and notes available.  Pertinent system review for today's visit includes:  Significant overnight events:     Neuro: Sedated, RASS -4-5  Cardiac: HR 's, BP /60-70 " on levophed at 1.2mcg/kg/min  Pulmonary: APVC 28/330/14/55% -> 7.37/37/68/22 P:F 123  Heme: Unchanged.  + Leukocytosis    DVT Prophylaxis: Heparin  /Renal: 57 ml urine output in 24 hours, HD with 2.0L out   I/O: 3792/2057 = +1735 = +3338   Thomas: +  ID:  Febrile to 100.4, MRSA negative, blood and respiratory cultures NGTD, Group A Strep Positive, Mycoplasma pna +, Parainfluenza + on Unasyn (3), Linezolid (2) and Doxy (2)  Skin: Intact  GI/Nutrition: NPO   Prophylaxis: Pepcid  Endo: No active issues  Additional data:   Lines/Tubes: PIV, Right Fem HD cath      Review of Systems  Review of Systems   Unable to perform ROS: Intubated        Vital Signs for last 24 hours   Temp:  [37.4 °C (99.3 °F)] 37.4 °C (99.3 °F)  Pulse:  [] 88  Resp:  [28-44] 28  BP: ()/(27-58) 102/50  SpO2:  [90 %-100 %] 94 %    Hemodynamic parameters for last 24 hours       Respiratory Information for the last 24 hours  Vent Mode: APVCMV  Rate (breaths/min): 28  Vt Target (mL): 330  PEEP/CPAP: 14  MAP: 20  Control VTE (exp VT): 331    Physical Exam   Physical Exam  Constitutional:       Comments: Deep sedation, RASS -4   HENT:      Mouth/Throat:      Mouth: Mucous membranes are moist.   Cardiovascular:      Rate and Rhythm: Normal rate and regular rhythm.   Pulmonary:      Breath sounds: Rhonchi present. No wheezing or rales.   Abdominal:      General: There is no distension.      Palpations: Abdomen is soft.   Musculoskeletal:         General: No swelling or deformity.   Skin:     General: Skin is warm and dry.   Neurological:      Comments: Sedated to RASS - 5         Medications  Current Facility-Administered Medications   Medication Dose Route Frequency Provider Last Rate Last Admin    ampicillin/sulbactam (Unasyn) 3 g in  mL IVPB  3 g Intravenous Q EVENING Evi Calderon M.D.        propofol (DIPRIVAN) injection  0-80 mcg/kg/min (Ideal) Intravenous Continuous Evi Calderon M.D. 26.2 mL/hr at 03/26/25 0554 80  mcg/kg/min at 03/26/25 0554    Linezolid (Zyvox) premix 600 mg  600 mg Intravenous Q12HRS Evi Calderon M.D.   Stopped at 03/26/25 0626    NS (Bolus) 0.9 % infusion 100 mL  100 mL Intravenous DIALYSIS PRN Radha Lisa M.D.        heparin injection 1,400 Units  1,400 Units Intravenous DIALYSIS PRN Radha Lisa M.D.   1,400 Units at 03/25/25 1415    heparin injection 1,400 Units  1,400 Units Intravenous DIALYSIS PRN Radha Lisa M.D.   1,400 Units at 03/25/25 1415    HYDROmorphone (Dilaudid) injection 1 mg  1 mg Intravenous Q HOUR PRN Evi Calderon M.D.   1 mg at 03/26/25 0432    Or    HYDROmorphone (Dilaudid) injection 2 mg  2 mg Intravenous Q HOUR PRN Evi Calderon M.D.        HYDROmorphone (Dilaudid) 0.2 mg/mL in NS 50mL (Continuous Infusion)   Intravenous Continuous Evi Calderon M.D. 4.5 mL/hr at 03/25/25 2256 0.9 mg/hr at 03/25/25 2256    doxycycline (Vibramycin) 100 mg in  mL IVPB  100 mg Intravenous Q12HRS Evi Calderon M.D.   Stopped at 03/26/25 0632    acetylcysteine (Mucomyst) 20 % solution 3-5 mL  3-5 mL Inhalation 4X/DAY (RT) Vidya Louie D.O.        heparin injection 5,000 Units  5,000 Units Subcutaneous Q8HRS Evi Calderon M.D.   5,000 Units at 03/26/25 0519    acetaminophen (Tylenol) tablet 650 mg  650 mg Oral Q6HRS PRN Evi Calderon M.D.   650 mg at 03/25/25 2222    hydrALAZINE (Apresoline) injection 10 mg  10 mg Intravenous Q4HRS PRN Evi Calderon M.D.        norepinephrine (Levophed) 8 mg in 250 mL NS infusion (premix)  0-1 mcg/kg/min (Ideal) Intravenous Continuous Evi Calderon M.D. 12.3 mL/hr at 03/26/25 0618 0.12 mcg/kg/min at 03/26/25 0618    Respiratory Therapy Consult   Nebulization Continuous RT Evi Calderon M.D.        famotidine (Pepcid) tablet 20 mg  20 mg Enteral Tube Q12HRS Evi Calderon M.D.   20 mg at 03/24/25 1817    Or    famotidine (Pepcid) injection 20 mg  20 mg Intravenous Q12HRS Evi Calderon M.D.   20 mg at 03/26/25  0519    senna-docusate (Pericolace Or Senokot S) 8.6-50 MG per tablet 2 Tablet  2 Tablet Enteral Tube BID Evi Calderon M.D.   2 Tablet at 03/26/25 0519    And    polyethylene glycol/lytes (Miralax) Packet 1 Packet  1 Packet Enteral Tube QDAY PRN Evi Calderon M.D.        And    magnesium hydroxide (Milk Of Magnesia) suspension 30 mL  30 mL Enteral Tube QDAY PRN Evi Calderon M.D.        And    bisacodyl (Dulcolax) suppository 10 mg  10 mg Rectal QDAY PRN Evi Calderon M.D.        lidocaine (Xylocaine) 1 % injection 2 mL  2 mL Tracheal Tube Q30 MIN PRN Evi Calderon M.D.        ipratropium-albuterol (DUONEB) nebulizer solution  3 mL Nebulization Q4H PRN (RT) CÉSAR MccallumORima   3 mL at 03/25/25 1834       Fluids    Intake/Output Summary (Last 24 hours) at 3/26/2025 0649  Last data filed at 3/26/2025 0600  Gross per 24 hour   Intake 3792.24 ml   Output 2057 ml   Net 1735.24 ml       Laboratory  Recent Labs     03/24/25  1522 03/25/25  0454 03/26/25  0522   ISTATAPH 7.259* 7.239* 7.375   ISTATAPCO2 53.2* 49.6* 37.7   ISTATAPO2 72* 75* 68*   ISTATATCO2 25* 23* 23*   FAEAQRE5CKE 91* 92* 93   ISTATARTHCO3 23.9 21.2 22.0   ISTATARTBE -4 -6* -3   ISTATTEMP 39.7 C 37.4 C 37.6 C   ISTATFIO2 90 100 55   ISTATSPEC Arterial Arterial Arterial   ISTATAPHTC 7.222* 7.233* 7.366   GLWMXISZ2KF 86 77* 71*         Recent Labs     03/24/25  1420 03/25/25  0345 03/26/25  0415   SODIUM 138 138 134*   POTASSIUM 4.6 4.0 4.4   CHLORIDE 105 104 99   CO2 23 18* 20   BUN 18 23* 23*   CREATININE 2.44* 4.32* 5.64*   MAGNESIUM 1.6 2.0 1.8   PHOSPHORUS 4.3 5.0* 5.3*   CALCIUM 8.5 8.7 8.4     Recent Labs     03/24/25  1420 03/25/25  0345 03/26/25  0415   ALTSGPT 70* 67* 53*   ASTSGOT 41 45 38   ALKPHOSPHAT 56 60 62   TBILIRUBIN 0.5 0.7 0.6   GLUCOSE 82 77 100*     Recent Labs     03/24/25  1420 03/25/25  0345 03/26/25  0415   WBC 14.5* 15.3* 16.6*   NEUTSPOLYS 88.00*  --   --    LYMPHOCYTES 6.70*  --   --    MONOCYTES  3.50  --   --    EOSINOPHILS 0.30  --   --    BASOPHILS 0.10  --   --    ASTSGOT 41 45 38   ALTSGPT 70* 67* 53*   ALKPHOSPHAT 56 60 62   TBILIRUBIN 0.5 0.7 0.6     Recent Labs     03/24/25  1420 03/25/25  0345 03/26/25  0415   RBC 3.26* 3.35* 2.85*   HEMOGLOBIN 11.1* 11.3* 10.3*   HEMATOCRIT 33.8* 35.0* 29.5*   PLATELETCT 319 363 338       Imaging  X-Ray:  I have personally reviewed the images and compared with prior images.    Assessment/Plan  #LLL consolidation/Pneumonia  #Ventilator dependent respiratory failure  #Potential ARDS: P:F 75 (PaO2 75 on 100% FiO2), increased infiltrates, now bilateral, Patient is not clinically fluid overloaded however is anuric.   - Differential includes pulmonary edema 2/2 renal failure  Plan:  - Admit to ICU  - Daily SAT/SBT per protocol - Holding  - PRN ABG  - Elevate HOB to 30 degrees  - Chlorhexidine rinse to decrease VAP incidence  - Maintain O2 saturation > 90%   - Tube placement verified  - Pepcid for GI prophylaxis  - Sedation with propofol/versed for RASS -5  - Monitor triglycerides periodically 109 -> 265 Switch to versed today  - Analgesia with Fentanyl for CPOT < 2  - Restraints to prevent self extubation  - Utilizing lung protective ventilation with tidal volume 6ml/kg  - Monitoring peak and plateau pressures  - Abx coverage with Unasyn, Linezolid and Doxy     #Group A Strep  Plan:  - CT neck with contrast without abscess formation  - Continue Linezolid for at least 3 days  - Continue Unasyn    #Parainfluenza Virus  Plan:  - Supportive care    #Mycoplasma  Plan:  - Doxycycline for 7 days    #Septic Shock  #Sedation related shock  Plan:  - MAP goal > 65, SBP > 90  - Titrate Levophed to MAP goal   - Abx coverage as above     #Respiratory acidosis  Plan:  - Increased respiratory rate on ventilator  - continue to work towards vent synchrony      #Acute Kidney Injury  #Urosepsis  Plan:  - Monitor UOP  - Avoid nephrotoxins as able  - Monitor renal function on daily labs  -  Consult nephrology  - HD today     #Macrocytic Anemia  Plan:  - Monitor on daily labs  - Transfuse for hemoglobin < 7  - Folate and B12 levels WNL  - Outpatient workup     #Morbid obesity  Plan:  - no active plan while inpatient     #Hypomagnesemia  Plan:  - Monitor on labs  - Replete as needed    VTE:  Heparin  Ulcer: H2 Antagonist  Lines: Thomas Catheter  Ongoing indication addressed    I have performed a physical exam and reviewed and updated ROS and Plan today (3/26/2025). In review of yesterday's note (3/25/2025), there are no changes except as documented above.     Discussed patient condition and risk of morbidity and/or mortality with RN, RT, and Pharmacy  The patient remains critically ill.  Critical care time = 60 minutes in directly providing and coordinating critical care and extensive data review.  No time overlap and excludes procedures.    Evi Calderon MD RD  Pulmonary and Critical Care    Available on Voalte

## 2025-03-27 NOTE — PROGRESS NOTES
4 Eyes Skin Assessment Completed by Nithin RN and Tapan RN.    Head WDL  Ears WDL  Nose WDL  Mouth WDL  Neck WDL  Breast/Chest WDL  Shoulder Blades WDL  Spine WDL  (R) Arm/Elbow/Hand Redness, Blanching, and Edema  (L) Arm/Elbow/Hand Redness, Blanching, Discoloration, and Edema  Abdomen WDL  Groin Redness and Blanching  Scrotum/Coccyx/Buttocks Redness and Blanching  (R) Leg Scar and Edema  (L) Leg Scar and Edema  (R) Heel/Foot/Toe WDL  (L) Heel/Foot/Toe WDL          Devices In Places ECG, Blood Pressure Cuff, Pulse Ox, Thomas, SCD's, ET Tube, OG/NG, and Central Line      Interventions In Place Sacral Mepilex, Waffle Overlay, TAP System, Pillows, Q2 Turns, Low Air Loss Mattress, and Heels Loaded W/Pillows    Possible Skin Injury Yes    Pictures Uploaded Into Epic Yes  Wound Consult Placed N/A  RN Wound Prevention Protocol Ordered Yes

## 2025-03-27 NOTE — PROGRESS NOTES
Hemodialysis done today, started @ 0935 and ended @ 1316 with net UF= 1600 ml. Blood returned @ the last 50 mins of tx due to impending cartridge clotting, replaced with new cartridge then tx restarted. VSS post HD. Report given to JIMMY Chakraborty. See PATH flow sheet for details

## 2025-03-27 NOTE — PROCEDURES
Nephrology/Hemodialysis note    Patient admitted with PNA, shock, YUKO - initiated RRT  Seen and examined during dialysis  No improvement in UOP  Tolerates well with pressor support  UF1- 2 L  Lab results reviewed  Please see dialysis flow sheet for details

## 2025-03-27 NOTE — PROGRESS NOTES
12-hour chart check complete.    Monitor Summary  Rhythm: SR-ST  Rate: 81-92  Ectopy: rPAC/PVC  Measurements: 0.16/0.08/0.39

## 2025-03-27 NOTE — CARE PLAN
The patient is Watcher - Medium risk of patient condition declining or worsening    Shift Goals  Clinical Goals: SpO2 >90%, RASS goal -5, start tube feeds, MAP >65, SBP >90  Patient Goals: MIREILLE  Family Goals: Updates    Progress made toward(s) clinical / shift goals:    Problem: Pain - Standard  Goal: Alleviation of pain or a reduction in pain to the patient’s comfort goal  Outcome: Progressing  Note: CPOT scores throughout the shift have remained 0. No PRNs have been needed at this time.      Problem: Skin Integrity  Goal: Skin integrity is maintained or improved  Outcome: Progressing  Note: Q2H turns remain in place for this patient with arms and legs floated w/ pillows.      Problem: Respiratory  Goal: Patient will achieve/maintain optimum respiratory ventilation and gas exchange  Outcome: Progressing  Note: The patient's FiO2 remains at 55% but peep had been decreased to 12. SpO2 remains 91-95%       Patient is not progressing towards the following goals:      Problem: Urinary Elimination  Goal: Establish and maintain regular urinary output  Outcome: Not Progressing  Note: The patient has had approximately 24 mL out this shift. Patient has received dialysis the past 2 days with 1-2L of fluid removed each session.

## 2025-03-27 NOTE — CARE PLAN
Vent Day 4    CMV 28, vt 330, peep 12, Fio2 60%      Problem: Ventilation  Goal: Ability to achieve and maintain unassisted ventilation or tolerate decreased levels of ventilator support  Description: Target End Date:  4 days Document on Vent flowsheet1.  Support and monitor invasive and noninvasive mechanical ventilation2.  Monitor ventilator weaning response3.  Perform ventilator associated pneumonia prevention interventions4.  Manage ventilation therapy by monitoring diagnostic test results  Outcome: Progressing

## 2025-03-27 NOTE — PROGRESS NOTES
12-hour chart check complete.    Monitor Summary  Rhythm: SR/ST  Rate:   Ectopy: rPAC/PVC  Measurements: .16/.08/.34

## 2025-03-27 NOTE — PROGRESS NOTES
4 Eyes Skin Assessment Completed by JIMMY Clark and JIMMY Carolina.    Head WDL  Ears WDL  Nose WDL  Mouth WDL  Neck WDL  Breast/Chest WDL  Shoulder Blades WDL  Spine WDL  (R) Arm/Elbow/Hand Redness, Blanching, and Edema  (L) Arm/Elbow/Hand Redness, Blanching, Discoloration, and Edema  Abdomen WDL  Groin Redness and Blanching  Scrotum/Coccyx/Buttocks Redness and Blanching  (R) Leg Scar and Edema  (L) Leg Scar and Edema  (R) Heel/Foot/Toe WDL  (L) Heel/Foot/Toe WDL          Devices In Places ECG, Blood Pressure Cuff, Pulse Ox, Thomas, SCD's, ET Tube, OG/NG, and Central Line      Interventions In Place Sacral Mepilex, Waffle Overlay, TAP System, Pillows, Q2 Turns, Low Air Loss Mattress, Barrier Cream, and Heels Loaded W/Pillows    Possible Skin Injury Yes      Pictures Uploaded Into Epic Yes  Wound Consult Placed N/A  RN Wound Prevention Protocol Ordered Yes

## 2025-03-27 NOTE — PROGRESS NOTES
"Pulmonary & Critical Care Progress Note    Date of admission  3/24/2025    Chief Complaint  19 y.o. male admitted 3/24/2025 with respiratory failure    Hospital Course  Katey is a 19 year old male with down syndrome and obesity admitted with respiratory complaints and hypoxia who was intubated in the emergency room there.  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  He was initially coughing and had a fever but then became hypoxic to the 70's-80's for a couple days prior to bringing him in. He had some diarrhea and upset stomach for a couple days as well. He was uncooperative with NC oxygen and medical treatment. He is on low dose levophed peripherally which I am told is much improved from prior. He has been treated for a left sided pneumonia with vanc and zosyn and this morning developed a complete \"left lung white out\". The transferring physician is unable to tell me if this is plugging, effusion or pneumonia as he states the patient has not been stable enough to get a CT and they do not have POCUS available. He is on the ventilator with a tidal volume of 340, PEEP 10 and FiO2 of 0.70 with a VBG 7.36/44/65 improved from 7.29/56/63/27. The transferring physician does not feel comfortable placing a central line prior to transfer due to difficulty accessing the IJ. Patient has now become anuric.      On arrival Katey was dysynchonous with the ventilator despite about 10mg of versed and several pushes of ketamine in route, a versed gtt and fentanyl gtt.  He arrived on 3.2 mcg/min of levophed.    Interval Problem Update  Reviewed last 24 hour events:  ICU CHECKLIST:  Chart review from the past 24 hours includes imaging, laboratory studies, vital signs and notes available.  Pertinent system review for today's visit includes:  Significant overnight events:     Neuro: Sedated, RASS -4-5 on versed gtt and fentanyl  Cardiac: HR " 's, BP /40-50 on levophed at 1.6mcg/kg/min  Pulmonary: APVC 28/330/10/60%   Heme: Unchanged.  + Leukocytosis    DVT Prophylaxis: Heparin  /Renal: 30 ml urine output in 24 hours, HD with 2.0L out   I/O: 2709/2530 = +179 = +3517   Thomas: +  ID:  Febrile to 100.9, MRSA negative, blood and respiratory cultures NGTD, Group A Strep Positive, Mycoplasma pna +, Parainfluenza + on Unasyn (4), Linezolid (3) and Doxy (3)  Skin: Intact  GI/Nutrition: NPO on tube feeds   Prophylaxis: Pepcid  Endo: No active issues  Additional data:   Lines/Tubes: PIV, Right Fem HD cath      Review of Systems  Review of Systems   Unable to perform ROS: Intubated        Vital Signs for last 24 hours   Temp:  [37.3 °C (99.1 °F)-38.5 °C (101.3 °F)] 37.6 °C (99.7 °F)  Pulse:  [] 96  Resp:  [26-52] 28  BP: ()/(42-58) 93/46  SpO2:  [90 %-98 %] 92 %    Hemodynamic parameters for last 24 hours       Respiratory Information for the last 24 hours  Vent Mode: APVCMV  Rate (breaths/min): 28  Vt Target (mL): 330  PEEP/CPAP: 12  MAP: 17  Control VTE (exp VT): 327    Physical Exam   Physical Exam  Constitutional:       Comments: Deep sedation, RASS -4   HENT:      Mouth/Throat:      Mouth: Mucous membranes are moist.   Cardiovascular:      Rate and Rhythm: Normal rate and regular rhythm.   Pulmonary:      Breath sounds: Rhonchi present. No wheezing or rales.   Abdominal:      General: There is no distension.      Palpations: Abdomen is soft.   Musculoskeletal:         General: No swelling or deformity.   Skin:     General: Skin is warm and dry.   Neurological:      Comments: Sedated to RASS - 5         Medications  Current Facility-Administered Medications   Medication Dose Route Frequency Provider Last Rate Last Admin    [START ON 3/28/2025] famotidine (Pepcid) tablet 20 mg  20 mg Enteral Tube DAILY Evi Calderon M.D.        Or    [START ON 3/28/2025] famotidine (Pepcid) injection 20 mg  20 mg Intravenous DAILY Evi Calderon,  M.D.        linezolid (Zyvox) tablet 600 mg  600 mg Enteral Tube Q12HRS Evi Calderon M.D.        ampicillin/sulbactam (Unasyn) 3 g in  mL IVPB  3 g Intravenous Q EVENING Evi Calderon M.D.   Stopped at 03/26/25 1743    Pharmacy Consult: Enteral tube insertion - review meds/change route/product selection  1 Each Other PHARMACY TO DOSE Evi Calderon M.D.        acetaminophen (Tylenol) tablet 650 mg  650 mg Enteral Tube Q6HRS PRN Evi Calderon M.D.   650 mg at 03/26/25 1310    midazolam (Versed) premix 125 mg/125 mL infusion  0-10 mg/hr Intravenous Continuous Evi Calderon M.D. 10 mL/hr at 03/27/25 0437 10 mg/hr at 03/27/25 0437    midazolam (Versed) injection 5 mg  5 mg Intravenous Q HOUR PRN Evi Calderon M.D.   5 mg at 03/26/25 1601    NS (Bolus) 0.9 % infusion 100 mL  100 mL Intravenous DIALYSIS PRN Radha Lisa M.D.        heparin injection 1,400 Units  1,400 Units Intravenous DIALYSIS PRN Radha Lisa M.D.   1,400 Units at 03/26/25 1316    heparin injection 1,400 Units  1,400 Units Intravenous DIALYSIS PRN Radha Lisa M.D.   1,400 Units at 03/26/25 1313    HYDROmorphone (Dilaudid) injection 1 mg  1 mg Intravenous Q HOUR PRN Evi Calderon M.D.   1 mg at 03/26/25 0432    Or    HYDROmorphone (Dilaudid) injection 2 mg  2 mg Intravenous Q HOUR PRN Evi Calderon M.D.        HYDROmorphone (Dilaudid) 0.2 mg/mL in NS 50mL (Continuous Infusion)   Intravenous Continuous Evi Calderon M.D. 5.5 mL/hr at 03/27/25 0642 1.1 mg/hr at 03/27/25 0642    doxycycline (Vibramycin) 100 mg in  mL IVPB  100 mg Intravenous Q12HRS Evi Calderon M.D.   Stopped at 03/27/25 0626    acetylcysteine (Mucomyst) 20 % solution 3-5 mL  3-5 mL Inhalation 4X/DAY (RT) Vidya Louie D.O.   4 mL at 03/27/25 0652    heparin injection 5,000 Units  5,000 Units Subcutaneous Q8HRS Evi Calderon M.D.   5,000 Units at 03/27/25 0514    hydrALAZINE (Apresoline) injection 10 mg  10 mg Intravenous  Q4HRS PRN Evi Calderon M.D.        norepinephrine (Levophed) 8 mg in 250 mL NS infusion (premix)  0-1 mcg/kg/min (Ideal) Intravenous Continuous Evi Calderon M.D. 16.4 mL/hr at 03/27/25 0627 0.16 mcg/kg/min at 03/27/25 0627    Respiratory Therapy Consult   Nebulization Continuous RT Evi Calderon M.D.        senna-docusate (Pericolace Or Senokot S) 8.6-50 MG per tablet 2 Tablet  2 Tablet Enteral Tube BID Evi Calderon M.D.   2 Tablet at 03/27/25 0514    And    polyethylene glycol/lytes (Miralax) Packet 1 Packet  1 Packet Enteral Tube QDAY PRN Evi Calderon M.D.        And    magnesium hydroxide (Milk Of Magnesia) suspension 30 mL  30 mL Enteral Tube QDAY PRN Evi Calderon M.D.        And    bisacodyl (Dulcolax) suppository 10 mg  10 mg Rectal QDAY PRN Evi Calderon M.D.        lidocaine (Xylocaine) 1 % injection 2 mL  2 mL Tracheal Tube Q30 MIN PRN Evi Calderon M.D.        ipratropium-albuterol (DUONEB) nebulizer solution  3 mL Nebulization Q4H PRN (RT) Vidya Louie D.O.   3 mL at 03/26/25 1843       Fluids    Intake/Output Summary (Last 24 hours) at 3/27/2025 0755  Last data filed at 3/27/2025 0628  Gross per 24 hour   Intake 2709.09 ml   Output 2530 ml   Net 179.09 ml       Laboratory  Recent Labs     03/24/25  1522 03/25/25  0454 03/26/25  0522 03/27/25  0532   ISTATAPH 7.259* 7.239* 7.375  --    ISTATAPCO2 53.2* 49.6* 37.7  --    ISTATAPO2 72* 75* 68*  --    ISTATATCO2 25* 23* 23*  --    [id number] 91* 92* 93  --    ISTATARTHCO3 23.9 21.2 22.0  --    ISTATARTBE -4 -6* -3  --    ISTATTEMP 39.7 C 37.4 C 37.6 C 37.7 C   ISTATFIO2 90 100 55 60   ISTATSPEC Arterial Arterial Arterial Venous   ISTATAPHTC 7.222* 7.233* 7.366  --    [id number] 86 77* 71*  --          Recent Labs     03/25/25  0345 03/26/25  0415 03/27/25  0250   SODIUM 138 134* 135   POTASSIUM 4.0 4.4 4.1   CHLORIDE 104 99 98   CO2 18* 20 22   BUN 23* 23* 20   CREATININE 4.32* 5.64* 5.78*   MAGNESIUM 2.0 1.8  1.9   PHOSPHORUS 5.0* 5.3* 5.3*   CALCIUM 8.7 8.4 8.3*     Recent Labs     03/25/25  0345 03/26/25  0415 03/27/25  0250   ALTSGPT 67* 53* 40   ASTSGOT 45 38 35   ALKPHOSPHAT 60 62 66   TBILIRUBIN 0.7 0.6 0.8   GLUCOSE 77 100* 93     Recent Labs     03/24/25  1420 03/25/25  0345 03/26/25  0415 03/27/25  0250   WBC 14.5* 15.3* 16.6* 15.8*   NEUTSPOLYS 88.00*  --   --   --    LYMPHOCYTES 6.70*  --   --   --    MONOCYTES 3.50  --   --   --    EOSINOPHILS 0.30  --   --   --    BASOPHILS 0.10  --   --   --    ASTSGOT 41 45 38 35   ALTSGPT 70* 67* 53* 40   ALKPHOSPHAT 56 60 62 66   TBILIRUBIN 0.5 0.7 0.6 0.8     Recent Labs     03/25/25  0345 03/26/25  0415 03/27/25  0250   RBC 3.35* 2.85* 2.86*   HEMOGLOBIN 11.3* 10.3* 9.9*   HEMATOCRIT 35.0* 29.5* 29.1*   PLATELETCT 363 338 367       Imaging  X-Ray:  I have personally reviewed the images and compared with prior images.    Assessment/Plan  #LLL consolidation/Pneumonia  #Ventilator dependent respiratory failure  #Potential ARDS: P:F 75 (PaO2 75 on 100% FiO2), increased infiltrates, now bilateral, Patient is not clinically fluid overloaded however is anuric.   - Differential includes pulmonary edema 2/2 renal failure  Plan:  - Admit to ICU  - Daily SAT/SBT per protocol - Holding  - PRN ABG  - Elevate HOB to 30 degrees  - Chlorhexidine rinse to decrease VAP incidence  - Maintain O2 saturation > 90%   - Tube placement verified  - Pepcid for GI prophylaxis  - Sedation with versed gtt for RASS -5 (hypertriglyceridemia with prop)  - Analgesia with Fentanyl for CPOT < 2  - Restraints to prevent self extubation  - Utilizing lung protective ventilation with tidal volume 6ml/kg  - Monitoring peak and plateau pressures  - Abx coverage with Unasyn, Linezolid and Doxy     #Group A Strep  Plan:  - CT neck with contrast without abscess formation  - Continue Linezolid for at least 3 days  - Continue Unasyn    #Parainfluenza Virus  Plan:  - Supportive care    #Mycoplasma  Plan:  -  Doxycycline for 7 days    #Septic Shock  #Sedation related shock  Plan:  - MAP goal > 65, SBP > 90  - Titrate Levophed to MAP goal   - Abx coverage as above     #Respiratory acidosis  Plan:  - Increased respiratory rate on ventilator  - continue to work towards vent synchrony      #Acute Kidney Injury  #Urosepsis  Plan:  - Monitor UOP  - Avoid nephrotoxins as able  - Monitor renal function on daily labs  - Consult nephrology  - HD today     #Macrocytic Anemia  Plan:  - Monitor on daily labs  - Transfuse for hemoglobin < 7  - Folate and B12 levels WNL  - Outpatient workup     #Morbid obesity  Plan:  - no active plan while inpatient     #Hypomagnesemia  Plan:  - Monitor on labs  - Replete as needed    VTE:  Heparin  Ulcer: H2 Antagonist  Lines: Thomas Catheter  Ongoing indication addressed    I have performed a physical exam and reviewed and updated ROS and Plan today (3/27/2025). In review of yesterday's note (3/26/2025), there are no changes except as documented above.     Discussed patient condition and risk of morbidity and/or mortality with RN, RT, and Pharmacy  The patient remains critically ill.  Critical care time = 55 minutes in directly providing and coordinating critical care and extensive data review.  No time overlap and excludes procedures.    Evi Calderon MD RD  Pulmonary and Critical Care    Available on Voalte

## 2025-03-27 NOTE — DIETARY
Nutrition Services Brief Update:    Tube feed with Novasource Renal at goal rate of 50 ml/hour. Patient tolerating feeds per RN.    Problem: Nutritional:  Goal: Nutrition support tolerated and meeting greater than 85% of estimated needs  Outcome: Goal met    RD following

## 2025-03-27 NOTE — CARE PLAN
The patient is Watcher - Medium risk of patient condition declining or worsening    Shift Goals  Clinical Goals: RASS -5, Dialysis  Patient Goals: MIREILLE  Family Goals: Updates    Progress made toward(s) clinical / shift goals:    Problem: Pain - Standard  Goal: Alleviation of pain or a reduction in pain to the patient’s comfort goal  Outcome: Progressing     Problem: Safety - Medical Restraint  Goal: Remains free of injury from restraints (Restraint for Interference with Medical Device)  Outcome: Progressing     Problem: Skin Integrity  Goal: Skin integrity is maintained or improved  Outcome: Progressing     Problem: Fall Risk  Goal: Patient will remain free from falls  Outcome: Progressing       Patient is not progressing towards the following goals:      Problem: Urinary Elimination  Goal: Establish and maintain regular urinary output  Outcome: Not Progressing

## 2025-03-28 NOTE — PROCEDURES
Nephrology/Hemodialysis note    Patient admitted with PNA, shock, YUKO - initiated RRT  Seen and examined during dialysis  Intubated on vent  No improvement in UOP  Tolerates well with pressor support  UF1- 2 L  Lab results reviewed  Please see dialysis flow sheet for details  D/w

## 2025-03-28 NOTE — PROGRESS NOTES
"Pulmonary & Critical Care Progress Note    Date of admission  3/24/2025    Chief Complaint  19 y.o. male admitted 3/24/2025 with respiratory failure    Hospital Course  Katey is a 19 year old male with down syndrome and obesity admitted with respiratory complaints and hypoxia who was intubated in the emergency room there.  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  He was initially coughing and had a fever but then became hypoxic to the 70's-80's for a couple days prior to bringing him in. He had some diarrhea and upset stomach for a couple days as well. He was uncooperative with NC oxygen and medical treatment. He is on low dose levophed peripherally which I am told is much improved from prior. He has been treated for a left sided pneumonia with vanc and zosyn and this morning developed a complete \"left lung white out\". The transferring physician is unable to tell me if this is plugging, effusion or pneumonia as he states the patient has not been stable enough to get a CT and they do not have POCUS available. He is on the ventilator with a tidal volume of 340, PEEP 10 and FiO2 of 0.70 with a VBG 7.36/44/65 improved from 7.29/56/63/27. The transferring physician does not feel comfortable placing a central line prior to transfer due to difficulty accessing the IJ. Patient has now become anuric.      On arrival Katey was dysynchonous with the ventilator despite about 10mg of versed and several pushes of ketamine in route, a versed gtt and fentanyl gtt.  He arrived on 3.2 mcg/min of levophed.    3/27 - Vent day 5, HD Day 3, no changes in UOP  3/28 - Vent Day 6, HD #4, increased FiO2 overnight, CXR improved but with decreased volumes    Interval Problem Update  Reviewed last 24 hour events:  ICU CHECKLIST:  Chart review from the past 24 hours includes imaging, laboratory studies, vital signs and notes available.  Pertinent " system review for today's visit includes:  Significant overnight events:     Neuro: Sedated, RASS -4-5 on versed gtt and fentanyl  Cardiac: HR 's, BP /40-50 on levophed at 1.4mcg/kg/min  Pulmonary: APVC 28/330/12/70%   Heme: Unchanged.  + Leukocytosis    DVT Prophylaxis: Heparin  /Renal: 30 ml urine output in 24 hours, HD with 2.0L out   I/O: 3305/2527 = +778 = +4295   Thomas: +  ID:  Febrile to 100.6, MRSA negative, blood and respiratory cultures NGTD, Group A Strep Positive, Mycoplasma pna +, Parainfluenza + on Unasyn (4), Linezolid (3) and Doxy (3)  Skin: Intact  GI/Nutrition: NPO on tube feeds   Prophylaxis: Pepcid  Endo: No active issues  Additional data:   Lines/Tubes: PIV, Right Fem HD cath      Review of Systems  Review of Systems   Unable to perform ROS: Intubated        Vital Signs for last 24 hours   Temp:  [37.7 °C (99.9 °F)-38.2 °C (100.8 °F)] 37.9 °C (100.2 °F)  Pulse:  [] 82  Resp:  [28-31] 29  BP: ()/(42-58) 93/44  SpO2:  [87 %-97 %] 93 %    Hemodynamic parameters for last 24 hours       Respiratory Information for the last 24 hours  Vent Mode: APVCMV  Rate (breaths/min): 28  Vt Target (mL): 330  PEEP/CPAP: 12  MAP: 18  Control VTE (exp VT): 330    Physical Exam   Physical Exam  Constitutional:       Comments: Deep sedation, RASS -4   HENT:      Mouth/Throat:      Mouth: Mucous membranes are moist.   Cardiovascular:      Rate and Rhythm: Normal rate and regular rhythm.   Pulmonary:      Breath sounds: Rhonchi present. No wheezing or rales.   Abdominal:      General: There is no distension.      Palpations: Abdomen is soft.   Musculoskeletal:         General: No swelling or deformity.   Skin:     General: Skin is warm and dry.   Neurological:      Comments: Sedated to RASS - 5         Medications  Current Facility-Administered Medications   Medication Dose Route Frequency Provider Last Rate Last Admin    famotidine (Pepcid) tablet 20 mg  20 mg Enteral Tube DAILY Evi FREITAS  ALPESH Calderon        Or    famotidine (Pepcid) injection 20 mg  20 mg Intravenous DAILY Evi Calderon M.D.   20 mg at 03/28/25 0532    linezolid (Zyvox) tablet 600 mg  600 mg Enteral Tube Q12HRS Evi Calderon M.D.   600 mg at 03/28/25 0531    ampicillin/sulbactam (Unasyn) 3 g in  mL IVPB  3 g Intravenous Q EVENING Evi Calderon M.D.   Stopped at 03/27/25 1803    Pharmacy Consult: Enteral tube insertion - review meds/change route/product selection  1 Each Other PHARMACY TO DOSE Evi Calderon M.D.        acetaminophen (Tylenol) tablet 650 mg  650 mg Enteral Tube Q6HRS PRN Evi Calderon M.D.   650 mg at 03/27/25 1942    midazolam (Versed) premix 125 mg/125 mL infusion  0-10 mg/hr Intravenous Continuous Evi Calderon M.D. 10 mL/hr at 03/28/25 0357 10 mg/hr at 03/28/25 0357    midazolam (Versed) injection 5 mg  5 mg Intravenous Q HOUR PRN Evi Calderon M.D.   5 mg at 03/26/25 1601    NS (Bolus) 0.9 % infusion 100 mL  100 mL Intravenous DIALYSIS PRN Radha Lisa M.D.        heparin injection 1,400 Units  1,400 Units Intravenous DIALYSIS PRN Radha Lisa M.D.   1,400 Units at 03/27/25 1310    heparin injection 1,400 Units  1,400 Units Intravenous DIALYSIS PRN Radha Lisa M.D.   1,400 Units at 03/27/25 1310    HYDROmorphone (Dilaudid) injection 1 mg  1 mg Intravenous Q HOUR PRN Evi Calderon M.D.   1 mg at 03/26/25 0432    Or    HYDROmorphone (Dilaudid) injection 2 mg  2 mg Intravenous Q HOUR PRN Evi Calderon M.D.        HYDROmorphone (Dilaudid) 0.2 mg/mL in NS 50mL (Continuous Infusion)   Intravenous Continuous Evi Calderon M.D. 5.5 mL/hr at 03/28/25 0146 1.1 mg/hr at 03/28/25 0146    doxycycline (Vibramycin) 100 mg in  mL IVPB  100 mg Intravenous Q12HRS Evi Calderon M.D.   Stopped at 03/28/25 0633    acetylcysteine (Mucomyst) 20 % solution 3-5 mL  3-5 mL Inhalation 4X/DAY (RT) Vidya Louie D.O.   4 mL at 03/27/25 1902    heparin injection 5,000 Units   5,000 Units Subcutaneous Q8HRS Evi Calderon M.D.   5,000 Units at 03/28/25 0531    hydrALAZINE (Apresoline) injection 10 mg  10 mg Intravenous Q4HRS PRN Evi Calderon M.D.        norepinephrine (Levophed) 8 mg in 250 mL NS infusion (premix)  0-1 mcg/kg/min (Ideal) Intravenous Continuous Evi Calderon M.D. 14.3 mL/hr at 03/28/25 0547 0.14 mcg/kg/min at 03/28/25 0547    Respiratory Therapy Consult   Nebulization Continuous RT Evi Calderon M.D.        senna-docusate (Pericolace Or Senokot S) 8.6-50 MG per tablet 2 Tablet  2 Tablet Enteral Tube BID Evi Calderon M.D.   2 Tablet at 03/28/25 0531    And    polyethylene glycol/lytes (Miralax) Packet 1 Packet  1 Packet Enteral Tube QDAY PRN Evi Calderon M.D.   1 Packet at 03/27/25 1434    And    magnesium hydroxide (Milk Of Magnesia) suspension 30 mL  30 mL Enteral Tube QDAY PRN Evi Calderon M.D.        And    bisacodyl (Dulcolax) suppository 10 mg  10 mg Rectal QDAY PRN Evi Calderon M.D.        lidocaine (Xylocaine) 1 % injection 2 mL  2 mL Tracheal Tube Q30 MIN PRN Evi Calderon M.D.        ipratropium-albuterol (DUONEB) nebulizer solution  3 mL Nebulization Q4H PRN (RT) Vidya Louie D.O.   3 mL at 03/26/25 1843       Fluids    Intake/Output Summary (Last 24 hours) at 3/28/2025 0654  Last data filed at 3/28/2025 0600  Gross per 24 hour   Intake 3305.16 ml   Output 2527 ml   Net 778.16 ml       Laboratory  Recent Labs     03/26/25  0522 03/27/25  0532 03/28/25  0443   ISTATAPH 7.375  --   --    ISTATAPCO2 37.7  --   --    ISTATAPO2 68*  --   --    ISTATATCO2 23*  --   --    [id number] 93  --   --    ISTATARTHCO3 22.0  --   --    ISTATARTBE -3  --   --    ISTATTEMP 37.6 C 37.7 C 38.2 C   ISTATFIO2 55 60 70   ISTATSPEC Arterial Venous Venous   ISTATAPHTC 7.366  --   --    COIXXELD1MC 71*  --   --          Recent Labs     03/26/25  0415 03/27/25  0250 03/28/25  0440   SODIUM 134* 135 135   POTASSIUM 4.4 4.1 3.5*   CHLORIDE 99  98 98   CO2 20 22 25   BUN 23* 20 21   CREATININE 5.64* 5.78* 5.86*   MAGNESIUM 1.8 1.9 2.0   PHOSPHORUS 5.3* 5.3* 3.4   CALCIUM 8.4 8.3* 8.3*     Recent Labs     03/26/25  0415 03/27/25  0250 03/28/25  0440   ALTSGPT 53* 40 30   ASTSGOT 38 35 38   ALKPHOSPHAT 62 66 83   TBILIRUBIN 0.6 0.8 0.6   PREALBUMIN  --  <3.0*  --    GLUCOSE 100* 93 115*     Recent Labs     03/26/25  0415 03/27/25  0250 03/28/25  0440   WBC 16.6* 15.8* 12.7*   ASTSGOT 38 35 38   ALTSGPT 53* 40 30   ALKPHOSPHAT 62 66 83   TBILIRUBIN 0.6 0.8 0.6     Recent Labs     03/26/25 0415 03/27/25  0250 03/28/25  0440   RBC 2.85* 2.86* 2.71*   HEMOGLOBIN 10.3* 9.9* 9.5*   HEMATOCRIT 29.5* 29.1* 28.6*   PLATELETCT 338 367 365       Imaging  X-Ray:  I have personally reviewed the images and compared with prior images.    Assessment/Plan  #LLL consolidation/Pneumonia  #Ventilator dependent respiratory failure  #Potential ARDS: P:F 75 (PaO2 75 on 100% FiO2), increased infiltrates, now bilateral, Patient is not clinically fluid overloaded however is anuric.   - Differential includes pulmonary edema 2/2 renal failure  Plan:  - Continue  to ICU  - Daily SAT/SBT per protocol - Holding  - PRN ABG  - Elevate HOB to 30 degrees  - Chlorhexidine rinse to decrease VAP incidence  - Maintain O2 saturation > 90%   - Tube placement verified  - Pepcid for GI prophylaxis  - Sedation with versed gtt for RASS -5 (hypertriglyceridemia with prop)  - Analgesia with Fentanyl for CPOT < 2  - Restraints to prevent self extubation  - Utilizing lung protective ventilation with tidal volume 6ml/kg  - Monitoring peak and plateau pressures  - Abx coverage with Unasyn, Linezolid and Doxy  - Persistent fevers discussed with Dr. Roblero, starting IVIG today  - Added Zosyn, removed unasyn  - Bronched today, no purulence     #Group A Strep  Plan:  - CT neck with contrast without abscess formation  - Continue Linezolid for at least 3 days  - Unasyn -> Zosyn 3/28    #Parainfluenza  Virus  Plan:  - Supportive care    #Mycoplasma  Plan:  - Doxycycline for 7 days    #Septic Shock  #Sedation related shock  Plan:  - MAP goal > 65, SBP > 90  - Titrate Levophed to MAP goal   - Abx coverage as above     #Respiratory acidosis  Plan:  - Increased respiratory rate on ventilator  - continue to work towards vent synchrony      #Acute Anuric Kidney Failure  #Urosepsis  Plan:  - Monitor UOP  - Avoid nephrotoxins as able  - Monitor renal function on daily labs  - Consult nephrology  - HD today - Day 4     #Macrocytic Anemia  Plan:  - Monitor on daily labs  - Transfuse for hemoglobin < 7  - Folate and B12 levels WNL  - Outpatient workup     #Morbid obesity  Plan:  - no active plan while inpatient     #Hypomagnesemia  Plan:  - Monitor on labs  - Replete as needed    VTE:  Heparin  Ulcer: H2 Antagonist  Lines: Thomas Catheter  Ongoing indication addressed    I have performed a physical exam and reviewed and updated ROS and Plan today (3/28/2025). In review of yesterday's note (3/27/2025), there are no changes except as documented above.     Discussed patient condition and risk of morbidity and/or mortality with RN, RT, and Pharmacy  The patient remains critically ill.  Critical care time = 50 minutes in directly providing and coordinating critical care and extensive data review.  No time overlap and excludes procedures.    Evi Calderon MD RD  Pulmonary and Critical Care    Available on Voalte

## 2025-03-28 NOTE — CONSULTS
Consults  INFECTIOUS DISEASES INPATIENT CONSULT NOTE     Date of Service: 3/28/2025    Consult Requested By: Evi Calderon M.D.    Reason for Consultation: Septic shock, pneumonia    History of Present Illness:   Katey Persaud is a 19 y.o.  admitted 3/24/2025. Pt has a past medical history of Down syndrome and obesity.  He came to the ER with complaints of shortness of breath and was intubated there.  Per report he had had cough and fever prior to coming in as well as diarrhea and nausea.    Hospital Course:   Patient remains intubated on high flow oxygen in the ICU, and has required pressor support, ongoing low-grade fevers and leukocytosis.  Respiratory viral panel positive for mycoplasma pneumonia and parainfluenza.   Group A strep also positive.    Review Of Systems:  Review of Systems   Unable to perform ROS: Intubated       PMH:   No past medical history on file.    PSH:  No past surgical history on file.    FAMILY HX:  No family history on file.  Reviewed family history. No pertinent family history.     SOCIAL HX:  Social History     Socioeconomic History    Marital status: Single     Spouse name: Not on file    Number of children: Not on file    Years of education: Not on file    Highest education level: Not on file   Occupational History    Not on file   Tobacco Use    Smoking status: Not on file    Smokeless tobacco: Not on file   Substance and Sexual Activity    Alcohol use: Not on file    Drug use: Not on file    Sexual activity: Not on file   Other Topics Concern    Not on file   Social History Narrative    Not on file     Social Drivers of Health     Financial Resource Strain: Not on file   Food Insecurity: Not on file   Transportation Needs: Not on file   Physical Activity: Not on file   Stress: Not on file   Social Connections: Not on file   Intimate Partner Violence: Not on file   Housing Stability: Not on file     Social History     Tobacco Use   Smoking Status Not on file   Smokeless  Tobacco Not on file     Social History     Substance and Sexual Activity   Alcohol Use Not on file       Allergies/Intolerances:  No Known Allergies    History reviewed with the patient and /or family member, chart & primary care team    Other Current Medications:    Current Facility-Administered Medications:     piperacillin-tazobactam (Zosyn) 4.5 g in  mL IVPB, 4.5 g, Intravenous, Once **AND** piperacillin-tazobactam (Zosyn) 4.5 g in  mL IVPB, 4.5 g, Intravenous, Q12HRS, Lia Roblero M.D.    heparin injection 2,000 Units, 2,000 Units, Intravenous, Once, Radha Lisa M.D.    MD ALERT...Pharmacist to Dose IVIG 1 Each, 1 Each, Other, PHARMACY TO DOSE, Lia Roblero M.D.    famotidine (Pepcid) tablet 20 mg, 20 mg, Enteral Tube, DAILY **OR** famotidine (Pepcid) injection 20 mg, 20 mg, Intravenous, DAILY, Evi Calderon M.D., 20 mg at 03/28/25 0532    linezolid (Zyvox) tablet 600 mg, 600 mg, Enteral Tube, Q12HRS, Evi Calderon M.D., 600 mg at 03/28/25 0531    Pharmacy Consult: Enteral tube insertion - review meds/change route/product selection, 1 Each, Other, PHARMACY TO DOSE, Evi Calderon M.D.    acetaminophen (Tylenol) tablet 650 mg, 650 mg, Enteral Tube, Q6HRS PRN, Evi Calderon M.D., 650 mg at 03/27/25 1942    midazolam (Versed) premix 125 mg/125 mL infusion, 0-10 mg/hr, Intravenous, Continuous, Evi Calderon M.D., Last Rate: 10 mL/hr at 03/28/25 0709, 10 mg/hr at 03/28/25 0709    midazolam (Versed) injection 5 mg, 5 mg, Intravenous, Q HOUR PRN, Evi Calderon M.D., 5 mg at 03/26/25 1601    NS (Bolus) 0.9 % infusion 100 mL, 100 mL, Intravenous, DIALYSIS PRRadha CANSECO M.D.    heparin injection 1,400 Units, 1,400 Units, Intravenous, DIALYSIS PRNRadha M.D., 1,400 Units at 03/27/25 1310    heparin injection 1,400 Units, 1,400 Units, Intravenous, DIALYSIS PRRadha CANSECO M.D., 1,400 Units at 03/27/25 1310    HYDROmorphone (Dilaudid) injection 1 mg, 1 mg,  "Intravenous, Q HOUR PRN, 1 mg at 03/26/25 0432 **OR** HYDROmorphone (Dilaudid) injection 2 mg, 2 mg, Intravenous, Q HOUR PRN, Evi Calderon M.D.    HYDROmorphone (Dilaudid) 0.2 mg/mL in NS 50mL (Continuous Infusion), , Intravenous, Continuous, Evi Calderon M.D., Last Rate: 5.5 mL/hr at 03/28/25 1029, 1.1 mg/hr at 03/28/25 1029    doxycycline (Vibramycin) 100 mg in  mL IVPB, 100 mg, Intravenous, Q12HRS, Lia Roblero M.D., Stopped at 03/28/25 0633    acetylcysteine (Mucomyst) 20 % solution 3-5 mL, 3-5 mL, Inhalation, 4X/DAY (RT), Vidya Louie D.O., 4 mL at 03/27/25 1902    heparin injection 5,000 Units, 5,000 Units, Subcutaneous, Q8HRS, Evi Calderon M.D., 5,000 Units at 03/28/25 0531    hydrALAZINE (Apresoline) injection 10 mg, 10 mg, Intravenous, Q4HRS PRN, Evi Calderon M.D.    norepinephrine (Levophed) 8 mg in 250 mL NS infusion (premix), 0-1 mcg/kg/min (Ideal), Intravenous, Continuous, Evi Calderon M.D., Last Rate: 14.3 mL/hr at 03/28/25 0547, 0.14 mcg/kg/min at 03/28/25 0547    Respiratory Therapy Consult, , Nebulization, Continuous RT, Evi Calderon M.D.    senna-docusate (Pericolace Or Senokot S) 8.6-50 MG per tablet 2 Tablet, 2 Tablet, Enteral Tube, BID, 2 Tablet at 03/28/25 0531 **AND** polyethylene glycol/lytes (Miralax) Packet 1 Packet, 1 Packet, Enteral Tube, QDAY PRN, 1 Packet at 03/27/25 1434 **AND** magnesium hydroxide (Milk Of Magnesia) suspension 30 mL, 30 mL, Enteral Tube, QDAY PRN **AND** bisacodyl (Dulcolax) suppository 10 mg, 10 mg, Rectal, QDAY PRN, Evi Calderon M.D.    lidocaine (Xylocaine) 1 % injection 2 mL, 2 mL, Tracheal Tube, Q30 MIN PRN, Evi Calderon M.D.    ipratropium-albuterol (DUONEB) nebulizer solution, 3 mL, Nebulization, Q4H PRN (RT), Vidya Louie D.O., 3 mL at 03/26/25 5993  [unfilled]    Most Recent Vital Signs:  BP 95/47   Pulse 78   Temp 37.9 °C (100.2 °F) (Bladder)   Resp (!) 28   Ht 1.575 m (5' 2\") "   Wt 120 kg (263 lb 14.3 oz)   SpO2 96%   BMI 48.27 kg/m²   Temp  Av °C (100.4 °F)  Min: 37.3 °C (99.1 °F)  Max: 39.6 °C (103.3 °F)    Physical Exam:  Physical Exam  Constitutional:       Appearance: He is obese.   HENT:      Head: Normocephalic and atraumatic.      Right Ear: External ear normal.      Left Ear: External ear normal.      Nose: Nose normal.      Mouth/Throat:      Mouth: Mucous membranes are dry.      Comments: Tongue lesion  Cardiovascular:      Rate and Rhythm: Normal rate and regular rhythm.      Heart sounds: Normal heart sounds.   Pulmonary:      Effort: Pulmonary effort is normal.      Comments: Diffuse crackles, diminished breath sounds  Abdominal:      General: There is distension.   Skin:     General: Skin is warm.   Neurological:      Comments: Intubated and sedated           Pertinent Lab Results:  Recent Labs     25  0250 25  0440   WBC 16.6* 15.8* 12.7*      Recent Labs     25  0250 25  0440   HEMOGLOBIN 10.3* 9.9* 9.5*   HEMATOCRIT 29.5* 29.1* 28.6*   .5* 101.7* 105.5*   MCH 36.1* 34.6* 35.1*   PLATELETCT 338 367 365         Recent Labs     25  0250 25  0440   SODIUM 134* 135 135   POTASSIUM 4.4 4.1 3.5*   CHLORIDE 99 98 98   CO2    CREATININE 5.64* 5.78* 5.86*        Recent Labs     255 25  0250 25  0440   ALBUMIN 2.4* 2.4* 2.4*        Pertinent Micro:  Results       Procedure Component Value Units Date/Time    BLOOD CULTURE [[id number]]     Order Status: Sent Specimen: Blood from Peripheral     BLOOD CULTURE [[id number]]     Order Status: Sent Specimen: Blood from Peripheral     GRAM STAIN [[id number]] Collected: 25 7667    Order Status: Completed Specimen: Respirate Updated: 25     Significant Indicator .     Source RESP     Site BAL     Gram Stain Result Many WBCs.  No organisms seen.      Fungal Smear [[id number]] Collected: 25 6199     Order Status: Completed Specimen: Respirate Updated: 03/27/25 2212     Significant Indicator NEG     Source RESP     Site BAL     Fungal Smear Results No fungal elements seen.    Fungal Culture [[id number]] Collected: 03/24/25 1745    Order Status: Completed Specimen: Respirate Updated: 03/27/25 2211     Significant Indicator NEG     Source RESP     Site BAL     Culture Result Culture in progress.     Fungal Smear Results No fungal elements seen.    CULTURE RESPIRATORY W/ GRM STN [[id number]] Collected: 03/24/25 1745    Order Status: Completed Specimen: Respirate Updated: 03/27/25 2211     Significant Indicator NEG     Source RESP     Site BAL     Culture Result No growth at 48 hours.     Gram Stain Result Many WBCs.  No organisms seen.      AFB CULTURE AND STAIN (ACID-FAST BACILLUS) [[id number]] Collected: 03/24/25 1745    Order Status: Completed Updated: 03/27/25 1723     AFB Smear Results SEE NOTE     Comment: Negative for Acid Fast Bacteria.  Less than 5 mL of specimen received.  A minimum of 5 mL of sputum or fluid is recommended  for recovery of acid fast bacilli (AFB).  Volumes less than 5 mL are suboptimal and may  compromise recovery of AFB from culture.          Preliminary Report SEE NOTE     Comment: Specimen received and in progress.  Positive culture results are reported as soon as detected.  Final report to follow in seven to eight weeks  Performed By: [facility]  [address] [place] [id number]  : Yevgeniy Shafer MD, PhD  CLIA Number: [id number]         Respiratory Panel by PCR (Inpatient ONLY) [[id number]]  (Abnormal) Collected: 03/25/25 0647    Order Status: Completed Specimen: Respirate from Nasopharyngeal Updated: 03/25/25 1419     Adenovirus, PCR Not Detected     SARS-CoV-2 (COVID-19) RNA by DEVI NotDetected     Comment: [facility] providers: PLEASE REFER TO DE-ESCALATION AND RETESTING PROTOCOL  on [email or web address]    **The [employer] Respiratory Panel 2.1 (RP2.1)  RT-PCR test is FDA authorized.          Coronavirus 229E, PCR Not Detected     Coronavirus HKU1, PCR Not Detected     Coronavirus NL63, PCR Not Detected     Coronavirus OC43, PCR Not Detected     Human Metapneumovirus, PCR Not Detected     Rhino/Enterovirus, PCR Not Detected     Influenza A, PCR Not Detected     Influenza B, PCR Not Detected     Parainfluenza 1, PCR Not Detected     Parainfluenza 2, PCR Not Detected     Parainfluenza 3, PCR DETECTED     Parainfluenza 4, PCR Not Detected     RSV (Respiratory Syncytial Virus), PCR Not Detected     Bordetella parapertussis (PS7378), PCR Not Detected     Bordetella pertussis (ptxP), PCR Not Detected     Mycoplasma pneumoniae, PCR DETECTED     Chlamydia pneumoniae, PCR Not Detected    Group A Strep by PCR [[id number]]  (Abnormal) Collected: 03/25/25 0739    Order Status: Completed Specimen: Throat Updated: 03/25/25 0820     Group A Strep by PCR DETECTED    URINALYSIS [[id number]]  (Abnormal) Collected: 03/25/25 0705    Order Status: Completed Specimen: Urine, Thomas Cath Updated: 03/25/25 0805     Color Dark Yellow     Character Turbid     Specific Gravity 1.024     Ph 5.0     Glucose 100 mg/dL      Ketones Trace mg/dL      Protein 30 mg/dL      Bilirubin Small     Urobilinogen, Urine 1.0 EU/dL      Nitrite Negative     Leukocyte Esterase Moderate     Occult Blood Large     Micro Urine Req Microscopic    MRSA By PCR (Amp) [[id number]] Collected: 03/24/25 1700    Order Status: Completed Specimen: Respirate from Nares Updated: 03/24/25 2211     MRSA by PCR Negative    BLOOD CULTURE [[id number]] Collected: 03/24/25 1700    Order Status: Completed Specimen: Blood from Peripheral Updated: 03/24/25 2019     Significant Indicator NEG     Source BLD     Site PERIPHERAL     Culture Result No Growth  Note: Blood cultures are incubated for 5 days and  are monitored continuously.Positive blood cultures  are called to the RN and reported as soon as  they are identified.  Blood culture  "testing and Gram stain, if indicated, are  performed at [facility],  [facility] [place], [place].  Positive blood  cultures are sent to [facility],  [address] [place] [address], for organism identification  and susceptibility testing.      BLOOD CULTURE [[id number]] Collected: 03/24/25 1700    Order Status: Completed Specimen: Blood from Peripheral Updated: 03/24/25 2019     Significant Indicator NEG     Source BLD     Site PERIPHERAL     Culture Result No Growth  Note: Blood cultures are incubated for 5 days and  are monitored continuously.Positive blood cultures  are called to the RN and reported as soon as  they are identified.  Blood culture testing and Gram stain, if indicated, are  performed at [facility],  [address] [place] [address].  Positive blood  cultures are sent to [facility],  [address] [place] [address], for organism identification  and susceptibility testing.      CULTURE RESPIRATORY W/ GRM STN [[id number]] Collected: 03/24/25 1700    Order Status: Canceled Specimen: Other from Bronchoalveolar Lavage     AFB Culture [[id number]] Collected: 03/24/25 1700    Order Status: Sent Specimen: Respirate from Bronchoalveolar Lavage     Fungal Culture [[id number]] Collected: 03/24/25 1700    Order Status: Canceled Specimen: Other from Bronchoalveolar Lavage           No results found for: \"BLOODCULTU\", \"BLDCULT\", \"BCHOLD\"     Studies:  DX-CHEST-PORTABLE (1 VIEW)  Result Date: 3/28/2025  3/28/2025 7:30 AM HISTORY/REASON FOR EXAM:  Shortness of Breath. TECHNIQUE/EXAM DESCRIPTION AND NUMBER OF VIEWS: Single portable view of the chest. COMPARISON: 3/27/2025 FINDINGS: Increased right lung hazy opacification. Stable left lung hazy opacification. Body habitus limits the exam. Cardiac silhouette remains normal size. Endotracheal tube appears adequate. Nasogastric tube has been removed.     1. Limited exam second " to body habitus. 2. Increased right lung airspace disease and pleural fluid. 2. Stable left-sided airspace disease and pleural fluid.    DX-CHEST-PORTABLE (1 VIEW)  Result Date: 3/27/2025  3/27/2025 8:23 PM HISTORY/REASON FOR EXAM: Shortness of breath TECHNIQUE/EXAM DESCRIPTION AND NUMBER OF VIEWS: Single portable view of the chest. COMPARISON: 3/27/2025 FINDINGS: There are multiple supportive devices again seen, stable. There are bilateral interstitial infiltrates, left greater than right. There is small bilateral pleural effusion. The heart is normal in size.     Again seen bilateral interstitial infiltrates, left greater than right.    EC-ECHOCARDIOGRAM COMPLETE W/ CONT  Result Date: 3/27/2025  Transthoracic Echo Report Echocardiography Laboratory CONCLUSIONS No prior study is available for comparison. Technically difficult but adequate study for interpretation due to poor acoustic windows. Normal left ventricular systolic function. The left ventricular ejection fraction is visually estimated to be 60- 65%. Grossly normal right ventricular size and systolic function. ANNETTE IBARRA Exam Date:         2025                    15:07 Exam Location:     Out Patient Priority:          Routine Ordering Physician:        SAADIA MORFIN Referring Physician: Sonographer:               Viet Spears Age:    19     Gender:    M MRN:    [id number] :    2005 BSA:    2.15   Ht (in):    62     Wt (lb):    263 Exam Type:     Complete Indications:     Heart Failure, Systolic ICD Codes:       428.2 CPT Codes:       01752,  BP:   108    /   52     HR:   83 Technical Quality:       Technically difficult study -                          adequate information is obtained MEASUREMENTS  (Male / Female) Normal Values 2D ECHO LV Diastolic Diameter PLAX        4 cm                  4.2 - 5.9 / 3.9 - 5.3 cm LV Systolic Diameter PLAX         2.1 cm                2.1 - 4.0 cm IVS Diastolic Thickness           0.97 cm                LVPW Diastolic Thickness          1 cm                  LVOT Diameter                     2.1 cm                LV Ejection Fraction MOD BP       68.1 %                >= 55  % LV Ejection Fraction MOD 4C       67.9 %                LV Ejection Fraction MOD 2C       70.4 %                IVC Diameter                      2 cm                  DOPPLER AV Peak Velocity                  1.7 m/s               AV Peak Gradient                  11.1 mmHg             AV Mean Gradient                  5.2 mmHg              Mitral E Point Velocity           0.95 m/s              Mitral E to A Ratio               1.7                   MV Pressure Half Time             51 ms                 MV Area PHT                       4.3 cm2               MV Deceleration Time              176 ms                PV Peak Velocity                  1.5 m/s               PV Peak Gradient                  9.6 mmHg              PV Mean Gradient                  5.2 mmHg              RVOT Peak Velocity                1.4 m/s               * Indicates values subject to auto-interpretation LV EF:        % FINDINGS Left Ventricle Normal left ventricular chamber size. Normal left ventricular wall thickness. Normal left ventricular systolic function. The left ventricular ejection fraction is visually estimated to be 60-65%. Normal regional wall motion. Normal diastolic function. 3mL of contrast was used to enhance visualization of the endocardial border. Existing IV was used at the right AC. Right Ventricle Grossly normal right ventricular size and systolic function. Right Atrium The right atrium is not well visualized. Patient on a ventilator. Left Atrium The left atrium is not well visualized. Mitral Valve Structurally normal mitral valve. No mitral stenosis. Trace mitral regurgitation. Aortic Valve The aortic valve is not well visualized. No aortic valve stenosis. Trace aortic insufficiency. Tricuspid Valve The tricuspid valve is not well  visualized. No tricuspid stenosis. Trace tricuspid regurgitation. Unable to estimate pulmonary artery pressure due to an inadequate tricuspid regurgitant jet. Pulmonic Valve The pulmonic valve is not well visualized. No pulmonic stenosis. Trace pulmonic insufficiency. Pericardium No pericardial effusion. Aorta Normal aortic root for body surface area. The ascending aorta diameter is 2.1 cm. Nomi Jones MD (Electronically Signed) Final Date:     27 March 2025                 17:38    DX-CHEST-PORTABLE (1 VIEW)  Result Date: 3/27/2025  3/27/2025 1:59 PM HISTORY/REASON FOR EXAM: . Respiratory failure TECHNIQUE/EXAM DESCRIPTION AND NUMBER OF VIEWS: Single portable view of the chest. COMPARISON: 3/26/2025 FINDINGS: Endotracheal tube and gastric tube are again noted. The cardiomediastinal silhouette is stable. There is diffuse interstitial infiltrate with small bilateral pleural effusions. The appearance is suggestive of pulmonary edema. Underlying infection cannot be excluded. When compared with the prior exam, there is increased opacity at the lung bases. No pneumothorax is evident.     Persistent bilateral infiltrates and small effusions with increased opacity at the lung bases.    DX-CHEST-PORTABLE (1 VIEW)  Result Date: 3/26/2025  3/26/2025 4:46 AM HISTORY/REASON FOR EXAM:  For indication of respiratory failure. TECHNIQUE/EXAM DESCRIPTION AND NUMBER OF VIEWS: Single portable view of the chest. COMPARISON: 3/25/2025 FINDINGS: Stable large amount of bilateral airspace disease, greatest on the left. Cardiac silhouette is normal size. Endotracheal tube and nasogastric tube appear adequate.     Stable large amount of bilateral airspace disease, greatest on the left.    CT-SOFT TISSUE NECK WITH  Result Date: 3/25/2025  3/25/2025 9:27 AM HISTORY/REASON FOR EXAM:  Group A Strep. TECHNIQUE/EXAM DESCRIPTION AND NUMBER OF VIEWS:  CT soft tissue neck with contrast. The study was performed on a helical multidetector CT scanner.  Contiguous thin section helical images were obtained of the neck from the skull base through the thoracic inlet. 100 mL of Omnipaque 350 nonionic contrast was injected intravenously. Low dose optimization technique was utilized for this CT exam including automated exposure control and adjustment of the mA and/or kV according to patient size. COMPARISON: None. FINDINGS: Examination limited by artifact. Endotracheal tube terminates above the johny. Enteric tube projects over the stomach. BRAIN: Visualized portions of the brain are normal in appearance. TEMPORAL bones: The mastoids and middle ears are opacified. PARANASAL SINUSES: Paranasal sinuses are partially opacified. CERVICAL spine: There is no significant cervical spine abnormality. NODES: Bilateral enlarged cervical lymph nodes. 1.6 cm short axis and 1.0 cm short axis left level 2 lymph nodes and 1.1 cm short axis left level 3 lymph node. 1.3 cm short axis right level 2 and 0.9 cm short axis right level 3 lymph node. SALIVARY and THYROID gland: The parotid, submandibular, and thyroid gland are normal in appearance. AIRWAY: Upper airway partially obscured by ET tube and enteric tube. No definite peritonsillar abscess identified. MEDIASTINUM: Multiple enhancing lymph nodes within the superior mediastinum measuring up to 0.9 cm short axis. LUNGS: Right upper lobe patchy parenchymal opacities in left upper lobe consolidation. Small/moderate left pleural effusion.     1.  Examination limited by artifact. 2.  Bilateral cervical adenopathy. Superior mediastinal adenopathy. 3.  Upper airway is partially obscured by ET tube and enteric tube. No definite peritonsillar abscess identified. 4.  Opacified mastoids and middle ears. Inflammatory changes not excluded. 5.  Partially opacified paranasal sinuses. After changes not excluded. 6.  Patchy parenchymal opacities right upper lobe and consolidation left upper lobe.    CT-ABDOMEN-PELVIS W/O  Result Date:  3/25/2025  3/25/2025 9:27 AM HISTORY/REASON FOR EXAM:  Sepsis of unknown origin. TECHNIQUE/EXAM DESCRIPTION: CT scan of the abdomen and pelvis without contrast. Noncontrast helical scanning was obtained from the diaphragmatic domes through the pubic symphysis. Low dose optimization technique was utilized for this CT exam including automated exposure control and adjustment of the mA and/or kV according to patient size. COMPARISON: None. FINDINGS: Examination limited by artifact. Lower Chest: Consolidation within both lung bases and pleural effusions.. Liver: Diffuse low-attenuation liver consistent with fatty change.. Spleen: Spleen is within normal limits in size.. Pancreas: No pancreatic mass identified.. Gallbladder: No calcified stones. Biliary: Nondilated. Adrenal glands: No adrenal gland mass identified.. Kidneys: No hydronephrosis or urolithiasis. Bowel: No evidence of bowel obstruction or acute appendicitis.. Lymph nodes: Numerous small retroperitoneal lymph nodes. Numerous subcentimeter mesenteric lymph nodes.. Vasculature: Unremarkable. Peritoneum: Unremarkable without ascites. Pelvis: Catheter extends into a decompressed urinary bladder. Right femoral line projects into the right common and iliac vein. 10.4 mm short axis left inguinal lymph node and and 9.7 mm short axis right inguinal lymph node. Musculoskeletal: No acute or destructive process. No pelvic free fluid     1.  Examination limited by artifact. 2.  No evidence of bowel obstruction or acute appendicitis. No free fluid 3.   Hepatic steatosis. 4.  Bilateral lung base consolidation and pleural effusions.    CT-CTA CHEST PULMONARY ARTERY W/ RECONS  Result Date: 3/25/2025  3/25/2025 9:27 AM HISTORY/REASON FOR EXAM:  Respiratory Failure, possible PE TECHNIQUE/EXAM DESCRIPTION: CT angiogram scan for pulmonary embolism with contrast, with reconstructions. 1.25 mm helical sections were obtained from the lung apices through the lung bases following the  rapid bolus administration of 100 mL of Omnipaque 350 nonionic contrast. Thin-section overlapping reconstruction interval was utilized. Coronal reconstructions were generated from the axial data. MIP post processing was performed and utilized for the interpretation. Low dose optimization technique was utilized for this CT exam including automated exposure control and adjustment of the mA and/or kV according to patient size. COMPARISON: None FINDINGS: Examination limited by artifact. Pulmonary Embolism: No. Main Pulmonary Arteries: No. Segmental branches: No. Subsegmental branches: Obscured by artifact. Additional Comments: Cardiomegaly. No fluid collections. The aorta and branch vessels faintly opacified.. No aortic aneurysm identified. Lungs: Airspace opacities right lower lobe. Patchy opacities posterior right upper lobe. Airspace opacities left upper lobe including the lingula and left lower lobe.. The left lower lobe is almost completely opacified. Pleura: Small right pleural effusion. Small/moderate left pleural effusion.. Nodes: Limited evaluation.. Additional findings: Endotracheal tube terminates above the johny. Enteric tube projects over the stomach..     1.  Examination limited by artifact. 2.  No proximal pulmonary artery emboli identified. 3.  Bilateral airspace opacities consistent with atelectasis/consolidative pneumonia. 4.  Small right and small/moderate left pleural effusions.     DX-CHEST-PORTABLE (1 VIEW)  Result Date: 3/25/2025  3/25/2025 5:31 AM HISTORY/REASON FOR EXAM:  For indication of respiratory failure. TECHNIQUE/EXAM DESCRIPTION AND NUMBER OF VIEWS: Single portable view of the chest. COMPARISON: 3/24/2025 FINDINGS: Endotracheal tube and nasogastric tube appear adequate. Increased bilateral airspace disease, greatest on the left. Body habitus limits evaluation. Cardiac silhouette is stable.     Increasing bilateral airspace disease, greatest on the left.    [id number] VIEW  Result  Date: 3/24/2025  3/24/2025 2:27 PM HISTORY/REASON FOR EXAM: NG tube placement TECHNIQUE/EXAM DESCRIPTION AND NUMBER OF VIEWS: 1 supine views of the abdomen. COMPARISON: None FINDINGS: There is no evidence of bowel obstruction. NG tube tip overlies the gastric body. No abnormal calcifications are seen.     NG tube tip overlies the gastric body.    DX-CHEST-PORTABLE (1 VIEW)  Result Date: 3/24/2025  3/24/2025 2:27 PM HISTORY/REASON FOR EXAM: Post intubation TECHNIQUE/EXAM DESCRIPTION AND NUMBER OF VIEWS: Single portable view of the chest. COMPARISON: 3/24/2025 from another institution. FINDINGS: There is an endotracheal tube with tip located above the level of the clavicles. NG tube extends into the stomach. There is interval improvement in aeration of the left lung. There remains consolidation and volume loss. There is no pleural effusion. The heart is enlarged.     1.  Interval improvement in aeration of the left lung. There does remain volume loss and consolidation. 2.  Cardiomegaly. 3.  Endotracheal tube tip located just above the level of the clavicles.      ASSESSMENT/PLAN:     19 y.o.  admitted 3/24/2025. Pt has a past medical history of Down syndrome and obesity.  He came to the ER with complaints of shortness of breath and was intubated there.  Per report he had had cough and fever prior to coming in as well as diarrhea and nausea.    Hospital Course:   Patient remains intubated on high flow oxygen in the ICU, and has required pressor support, ongoing low-grade fevers and leukocytosis.  Respiratory viral panel positive for mycoplasma pneumonia and parainfluenza.   Group A strep also positive.    Problem List    Septic shock, continues to require pressor support  Fevers, improved from admit but still ongoing  VDRF, 12/70%  Pneumonia  -Respiratory PCR +group A strep, mycoplasma pneumoniae and parainfluenza 3  Concern for streptococcal toxic shock syndrome given symptoms prior to admit, ongoing fevers,  transaminitis on arrival, kidney failure and concern for ARDS  YUKO, anuric, continues to require CRRT  Down syndrome  Obesity    Assessment:      -Notes were extensively reviewed from primary team, radiology, ED, surgery, specialists, etc.   -Reviewed labs to date, microbiology for current admit and prior  -Imaging was independently reviewed and interpreted    Plan:    --- Stop Unasyn and escalated to Zosyn, will trial for a few days to see if this makes any difference  --- Continue p.o. linezolid  --- Continue IV doxycycline  --- Recommend trial IVIG x 3 days, discussed with pharmacy  --- Ordered repeat blood cultures  --- Follow-up prior blood culture, no growth to date  --- Follow-up BAL cultures, no growth to date  --- Monitor kidney function, renally dose medications      Dispo: Awaiting culture results and work-up as above.  Patient is at risk for infectious complications including death.    PICC: TBD      Plan of care discussed with HANNAH Calderon M.D.. Will continue to follow    Lia Roblero M.D.

## 2025-03-28 NOTE — PROGRESS NOTES
12-hour chart check complete.    Monitor Summary  Rhythm: SR  Rate: 80s  Ectopy: none  Measurements: 0.14/0.08/0.40

## 2025-03-28 NOTE — PROGRESS NOTES
4 Eyes Skin Assessment Completed by JIMMY Stratton and JIMMY Caldera.    Head WDL  Ears WDL  Nose WDL  Mouth Dry  Neck Redness and Blanching  Breast/Chest WDL  Shoulder Blades WDL  Spine WDL  (R) Arm/Elbow/Hand Edema  (L) Arm/Elbow/Hand Edema  Abdomen WDL  Groin Redness and Blanching  Scrotum/Coccyx/Buttocks Redness and Blanching  (R) Leg Edema  (L) Leg Edema  (R) Heel/Foot/Toe WDL  (L) Heel/Foot/Toe WDL          Devices In Places ECG, Blood Pressure Cuff, Pulse Ox, Thomas, SCD's, ET Tube, OG/NG, and Central Line      Interventions In Place TAP System, Pillows, Q2 Turns, Low Air Loss Mattress, Heels Loaded W/Pillows, and Pressure Redistribution Mattress    Possible Skin Injury Yes    Pictures Uploaded Into Epic Yes  Wound Consult Placed N/A  RN Wound Prevention Protocol Ordered Yes

## 2025-03-28 NOTE — PROCEDURES
Bronchoscopy Procedure Note      Results/Findings: Erythematous, swollen airways, friable    Specimen: none    Location: [facility] ICU Bed 3318    Date of Operation: 3/28/25    Attending Physician Performing Procedure: Evi Calderon M.D.     Pre-op Diagnosis: Respiratory failure    Post-op Diagnosis: Respiratory failure    Estimated Blood Loss: None    Complications: None    Indications and History:    The patient is a 19 y.o. male. Procedure done emergently to assess for mucus plugging.     Description of Procedure:    All lobes and segments inspected.  Airways are friable and angry, erythematous and edematous but without any notable purulence.  Patient has significant dynamic airway collapse with coughing.      The patient tolerated the procedure well.    Evi Calderon MD RD  Pulmonary and Critical Care    Available on Voalte

## 2025-03-28 NOTE — CARE PLAN
Problem: Respiratory  Goal: Patient will achieve/maintain optimum respiratory ventilation and gas exchange  Outcome: Progressing   The patient is Watcher - Medium risk of patient condition declining or worsening    Shift Goals  Clinical Goals: RASS -5, SpO2 >88%, Monitor I/O  Patient Goals: MIREILLE  Family Goals: Updates    Progress made toward(s) clinical / shift goals:  Pt tolerated 1.6L of fluid removed during dialysis. Pt still anuric. Will continue to titrate levo down as able. Bronch done to assess for mucous plugging. Pt currently on 60% FIO2 which is less than this morning.   Pts Elizabeth callahan updated over the phone. Verbalizes understanding of plan of care although very tearful. All questions answered.     Patient is not progressing towards the following goals:

## 2025-03-28 NOTE — CARE PLAN
Problem: Bronchopulmonary Hygiene  Goal: Increase mobilization of retained secretions  Description: Target End Date:  2 to 3 days1.  Perform bronchopulmonary therapy as indicated by assessment2.  Perform airway suctioning3.  Perform actions to maintain patient airway  Outcome: Not Progressing     Problem: Ventilation  Goal: Ability to achieve and maintain unassisted ventilation or tolerate decreased levels of ventilator support  Description: Target End Date:  4 days Document on Vent flowsheet1.  Support and monitor invasive and noninvasive mechanical ventilation2.  Monitor ventilator weaning response3.  Perform ventilator associated pneumonia prevention interventions4.  Manage ventilation therapy by monitoring diagnostic test results  Outcome: Not Progressing

## 2025-03-28 NOTE — PROGRESS NOTES
Hemodialysis done today, started @ 0916 and ended @ 1237 with net UF= 1600 ml. Blood returned @ the last  hour and 20 mins of tx due to impending cartridge clotting, replaced with new cartridge, then tx restarted. VSS post HD. Report given to JIMMY Connelly. See PATH flow sheet for details

## 2025-03-28 NOTE — PROGRESS NOTES
12-hour chart check complete.    Monitor Summary  Rhythm: SR/ST  Rate:   Ectopy: N/A  Measurements: .14/.08/.32

## 2025-03-28 NOTE — PROGRESS NOTES
4 Eyes Skin Assessment Completed by JIMMY Borges and Tapan RN.    Head WDL  Ears WDL  Nose WDL  Mouth Ulcer(s) tongue ulcer/ possible bite?   Neck Redness skin tags  Breast/Chest WDL  Shoulder Blades WDL  Spine WDL  (R) Arm/Elbow/Hand Edema  (L) Arm/Elbow/Hand WDL  Abdomen WDL  Groin Redness and Blanching  Scrotum/Coccyx/Buttocks Redness and Blanching  (R) Leg Edema  (L) Leg Edema  (R) Heel/Foot/Toe WDL  (L) Heel/Foot/Toe WDL          Devices In Places ECG, Blood Pressure Cuff, Pulse Ox, Thomas, SCD's, ET Tube, and OG/NG      Interventions In Place Sacral Mepilex, TAP System, Pillows, Q2 Turns, Low Air Loss Mattress, and ZFlo Pillow    Possible Skin Injury Yes    Pictures Uploaded Into Epic Yes  Wound Consult Placed Yes  RN Wound Prevention Protocol Ordered Yes

## 2025-03-29 NOTE — CARE PLAN
The patient is Watcher - Medium risk of patient condition declining or worsening    Shift Goals  Clinical Goals: Wean FiO2 as tolerated, SpO2 >90%, RASS -5  Patient Goals: MIREILLE  Family Goals: Updates    Progress made toward(s) clinical / shift goals:  The patient remains on 70% FiO2 at this time. Levophed concentration increased to 32 mg/500 mL. Sedation changed to propofol at 20 mcg in order to wean versed gtt. Patient tolerated remainder of IVIG infusion well. Barrier wipes and barrier paste added to perineal area help protect form skin breakdown since patient is having frequent loose bowel movements.    Problem: Respiratory  Goal: Patient will achieve/maintain optimum respiratory ventilation and gas exchange  Outcome: Progressing     Problem: Skin Integrity  Goal: Skin integrity is maintained or improved  Outcome: Progressing       Patient is not progressing towards the following goals:      Problem: Urinary Elimination  Goal: Establish and maintain regular urinary output  Outcome: Not Progressing

## 2025-03-29 NOTE — PROGRESS NOTES
"Pulmonary & Critical Care Progress Note    Date of admission  3/24/2025    Chief Complaint  19 y.o. male admitted 3/24/2025 with respiratory failure    Hospital Course  Katey is a 19 year old male with down syndrome and obesity admitted with respiratory complaints and hypoxia who was intubated in the emergency room there.  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  He was initially coughing and had a fever but then became hypoxic to the 70's-80's for a couple days prior to bringing him in. He had some diarrhea and upset stomach for a couple days as well. He was uncooperative with NC oxygen and medical treatment. He is on low dose levophed peripherally which I am told is much improved from prior. He has been treated for a left sided pneumonia with vanc and zosyn and this morning developed a complete \"left lung white out\". The transferring physician is unable to tell me if this is plugging, effusion or pneumonia as he states the patient has not been stable enough to get a CT and they do not have POCUS available. He is on the ventilator with a tidal volume of 340, PEEP 10 and FiO2 of 0.70 with a VBG 7.36/44/65 improved from 7.29/56/63/27. The transferring physician does not feel comfortable placing a central line prior to transfer due to difficulty accessing the IJ. Patient has now become anuric.      On arrival Katey was dysynchonous with the ventilator despite about 10mg of versed and several pushes of ketamine in route, a versed gtt and fentanyl gtt.  He arrived on 3.2 mcg/min of levophed.    3/27 - Vent day 5, HD Day 3, no changes in UOP  3/28 - Vent Day 6, HD #4, increased FiO2 overnight, CXR improved but with decreased volumes  3/29 - Vent Day 7, HD #5, Increased O2 needs to 100% overnight PEEP 14, Driving pressure 17, Plat 31, Peak 38.  Katey has required high PEEP to overcome the weight of his chest and achieve " adequate saturations.  Bronchoscopy yesterday without significant purulence or secretions.  POCUS performed by Dr. Fallon last night without significant effusion but positive for bilateral densely consolidated lung.      Interval Problem Update  Reviewed last 24 hour events:  ICU CHECKLIST:  Chart review from the past 24 hours includes imaging, laboratory studies, vital signs and notes available.  Pertinent system review for today's visit includes:  Significant overnight events:     Neuro: Sedated, RASS -4-5 on versed gtt and fentanyl  Cardiac: HR 's, BP /40-60 on levophed at 1.4mcg/kg/min  Pulmonary: APVC 28/330/14/100% -> 7.4/38/59/23.9 - will decrease the rate to 24. Poor compliance, worsened today  Heme: Unchanged.  + Leukocytosis    DVT Prophylaxis: Heparin  /Renal: 44 ml urine output in 24 hours, HD with 2.5L out   I/O: 3222/2549= +673 = +4968   Thomas: +  ID:  Febrile to 101.1, MRSA negative, blood and respiratory cultures NGTD, Group A Strep Positive, Mycoplasma pna +, Parainfluenza + on Unasyn (4), Linezolid (3) and Doxy (3)  Skin: Intact  GI/Nutrition: NPO on tube feeds   Prophylaxis: Pepcid  Endo: No active issues  Additional data:   Lines/Tubes: PIV, Right Fem HD cath      Review of Systems  Review of Systems   Unable to perform ROS: Intubated        Vital Signs for last 24 hours   Temp:  [37.6 °C (99.7 °F)-38.4 °C (101.1 °F)] 37.7 °C (99.9 °F)  Pulse:  [68-96] 68  Resp:  [9-33] 28  BP: ()/(34-62) 108/54  SpO2:  [87 %-97 %] 93 %    Hemodynamic parameters for last 24 hours       Respiratory Information for the last 24 hours  Vent Mode: APVCMV  Rate (breaths/min): 28  Vt Target (mL): 330  PEEP/CPAP: 14  MAP: 20  Control VTE (exp VT): 332    Physical Exam   Physical Exam  Constitutional:       Comments: Deep sedation, RASS -4   HENT:      Mouth/Throat:      Mouth: Mucous membranes are moist.   Cardiovascular:      Rate and Rhythm: Normal rate and regular rhythm.   Pulmonary:      Breath  sounds: Rhonchi present. No wheezing or rales.   Abdominal:      General: There is no distension.      Palpations: Abdomen is soft.   Musculoskeletal:         General: No swelling or deformity.   Skin:     General: Skin is warm and dry.   Neurological:      Comments: Sedated to RASS - 5         Medications  Current Facility-Administered Medications   Medication Dose Route Frequency Provider Last Rate Last Admin    heparin injection 500 Units  500 Units Intravenous DIALYSIS PRN Radha Lisa M.D.        heparin injection 1,500 Units  1,500 Units Intravenous DIALYSIS PRN Radha Lisa M.D. MD ALERT...Pharmacist to Dose IVIG 1 Each  1 Each Other PHARMACY TO DOSE Lia Roblero M.D.        immune globulin (Gamunex-C) 25 g in empty bag 250 mL IVIG  0.5 g/kg (Ideal) Intravenous DAILY AT NOON Lia Roblero M.D.        piperacillin-tazobactam (Zosyn) 4.5 g in  mL IVPB  4.5 g Intravenous Q12HRS Evi Calderon M.D.        propofol (DIPRIVAN) injection  20 mcg/kg/min (Ideal) Intravenous Continuous Alan Fallon M.D. 6.6 mL/hr at 03/2005 20 mcg/kg/min at 03/2005    hydrocortisone sodium succinate PF (Solu-CORTEF) injection 50 mg  50 mg Intravenous Q6HRS Alan Fallon M.D.   50 mg at 03/29/25 0530    norepinephrine (Levophed) infusion 32 mg/500 mL (premix)  0-1 mcg/kg/min (Ideal) Intravenous Continuous Alan Fallon M.D. 8.2 mL/hr at 03/29/25 0400 0.16 mcg/kg/min at 03/29/25 0400    famotidine (Pepcid) tablet 20 mg  20 mg Enteral Tube DAILY Evi Calderon M.D.        Or    famotidine (Pepcid) injection 20 mg  20 mg Intravenous DAILY Evi Calderon M.D.   20 mg at 03/29/25 0530    linezolid (Zyvox) tablet 600 mg  600 mg Enteral Tube Q12HRS Evi Calderon M.D.   600 mg at 03/29/25 0530    Pharmacy Consult: Enteral tube insertion - review meds/change route/product selection  1 Each Other PHARMACY TO DOSE Evi Calderon M.D.        acetaminophen (Tylenol) tablet 650 mg  650 mg  Enteral Tube Q6HRS PRN Evi Calderon M.D.   650 mg at 03/28/25 1328    midazolam (Versed) premix 125 mg/125 mL infusion  0-10 mg/hr Intravenous Continuous Evi Calderon M.D.   Stopped at 03/29/25 0529    midazolam (Versed) injection 5 mg  5 mg Intravenous Q HOUR PRN Evi Calderon M.D.   5 mg at 03/26/25 1601    NS (Bolus) 0.9 % infusion 100 mL  100 mL Intravenous DIALYSIS PRN Radha Lisa M.D.        heparin injection 1,400 Units  1,400 Units Intravenous DIALYSIS PRN Radha Lisa M.D.   1,400 Units at 03/28/25 1237    heparin injection 1,400 Units  1,400 Units Intravenous DIALYSIS PRN Radha Lisa M.D.   1,400 Units at 03/28/25 1237    HYDROmorphone (Dilaudid) injection 1 mg  1 mg Intravenous Q HOUR PRN Evi Calderon M.D.   1 mg at 03/26/25 0432    Or    HYDROmorphone (Dilaudid) injection 2 mg  2 mg Intravenous Q HOUR PRN Evi Calderon M.D.        HYDROmorphone (Dilaudid) 0.2 mg/mL in NS 50mL (Continuous Infusion)   Intravenous Continuous Evi Calderon M.D. 5.5 mL/hr at 03/29/25 0256 1.1 mg/hr at 03/29/25 0256    doxycycline (Vibramycin) 100 mg in  mL IVPB  100 mg Intravenous Q12HRS Lia Roblero M.D.   Stopped at 03/29/25 0632    heparin injection 5,000 Units  5,000 Units Subcutaneous Q8HRS Evi Calderon M.D.   5,000 Units at 03/29/25 0530    hydrALAZINE (Apresoline) injection 10 mg  10 mg Intravenous Q4HRS PRN Evi Calderon M.D.        Respiratory Therapy Consult   Nebulization Continuous RT Evi Calderon M.D.        senna-docusate (Pericolace Or Senokot S) 8.6-50 MG per tablet 2 Tablet  2 Tablet Enteral Tube BID Evi Calderon M.D.   2 Tablet at 03/28/25 0531    And    polyethylene glycol/lytes (Miralax) Packet 1 Packet  1 Packet Enteral Tube QDAY PRN Evi Calderon M.D.   1 Packet at 03/27/25 1434    And    magnesium hydroxide (Milk Of Magnesia) suspension 30 mL  30 mL Enteral Tube QDAY PRN Evi Calderon M.D.        And    bisacodyl (Dulcolax)  suppository 10 mg  10 mg Rectal QDAY PRN Evi Calderon M.D.        lidocaine (Xylocaine) 1 % injection 2 mL  2 mL Tracheal Tube Q30 MIN PRN Evi Calderon M.D.        ipratropium-albuterol (DUONEB) nebulizer solution  3 mL Nebulization Q4H PRN (RT) Vidya Louie D.O.   3 mL at 03/26/25 1843       Fluids    Intake/Output Summary (Last 24 hours) at 3/29/2025 0647  Last data filed at 3/29/2025 0600  Gross per 24 hour   Intake 3222.08 ml   Output 2549 ml   Net 673.08 ml       Laboratory  Recent Labs     03/28/25  1537 03/29/25  0448 03/29/25  0500   ISTATAPH 7.411  --  7.402   ISTATAPCO2 38.8  --  38.4   ISTATAPO2 76*  --  59*   ISTATATCO2 26*  --  25*   [id number] 95  --  90*   ISTATARTHCO3 24.6  --  23.9   ISTATARTBE 0  --  -1   ISTATTEMP 38.2 C 37.8 C 37.8 C   ISTATFIO2 60 80 80   ISTATSPEC Arterial Venous Arterial   ISTATAPHTC 7.393  --  7.390   [id number] 82*  --  63*         Recent Labs     03/27/25  0250 03/28/25  0440 03/29/25  0350   SODIUM 135 135 133*   POTASSIUM 4.1 3.5* 4.1   CHLORIDE 98 98 98   CO2 22 25 25   BUN 20 21 28*   CREATININE 5.78* 5.86* 5.57*   MAGNESIUM 1.9 2.0 1.8   PHOSPHORUS 5.3* 3.4 3.1   CALCIUM 8.3* 8.3* 8.3*     Recent Labs     03/27/25  0250 03/28/25  0440 03/29/25  0350   ALTSGPT 40 30 30   ASTSGOT 35 38 51*   ALKPHOSPHAT 66 83 86   TBILIRUBIN 0.8 0.6 0.4   PREALBUMIN <3.0*  --   --    GLUCOSE 93 115* 119*     Recent Labs     03/27/25  0250 03/28/25  0440 03/29/25  0350   WBC 15.8* 12.7* 15.4*   ASTSGOT 35 38 51*   ALTSGPT 40 30 30   ALKPHOSPHAT 66 83 86   TBILIRUBIN 0.8 0.6 0.4     Recent Labs     03/27/25  0250 03/28/25  0440 03/29/25  0350   RBC 2.86* 2.71* 2.51*   HEMOGLOBIN 9.9* 9.5* 9.3*   HEMATOCRIT 29.1* 28.6* 27.4*   PLATELETCT 367 365 366       Imaging  X-Ray:  I have personally reviewed the images and compared with prior images.    Assessment/Plan  #LLL consolidation/Pneumonia  #Ventilator dependent respiratory failure  #Potential ARDS: P:F 75 (PaO2 75 on  100% FiO2), increased infiltrates, now bilateral, Patient is not clinically fluid overloaded however is anuric.   - Differential includes pulmonary edema 2/2 renal failure  Plan:  - Continue  to ICU  - Daily SAT/SBT per protocol - Holding  - PRN ABG  - Elevate HOB to 30 degrees  - Chlorhexidine rinse to decrease VAP incidence  - Maintain O2 saturation > 90%   - Tube placement verified  - Pepcid for GI prophylaxis  - Sedation with versed gtt for RASS -5 (hypertriglyceridemia with prop)  - Analgesia with Fentanyl for CPOT < 2  - Restraints to prevent self extubation  - Utilizing lung protective ventilation with tidal volume 6ml/kg  - Monitoring peak and plateau pressures  - Abx coverage with Unasyn, Linezolid and Doxy  - Persistent fevers discussed with Dr. Roblero, starting IVIG today  - Added Zosyn, removed unasyn  - Bronched 3/28, no purulence  - Stress dose steroids started 3/28    #Group A Strep  Plan:  - CT neck with contrast without abscess formation  - Continue Linezolid   - Unasyn -> Zosyn 3/28  - IVIG started 3/28  - ID consulting    #Parainfluenza Virus  Plan:  - Supportive care    #Mycoplasma  Plan:  - Doxycycline for 7 days    #Septic Shock  #Sedation related shock  Plan:  - MAP goal > 65, SBP > 90  - Titrate Levophed to MAP goal   - Abx coverage as above     #Respiratory acidosis  Plan:  - Increased respiratory rate on ventilator  - continue to work towards vent synchrony      #Acute Anuric Kidney Failure  #Urosepsis  Plan:  - Monitor UOP  - Avoid nephrotoxins as able  - Monitor renal function on daily labs  - Consult nephrology  - HD today - Day 5 3.3L off  - CRRT if not negative today     #Macrocytic Anemia  Plan:  - Monitor on daily labs  - Transfuse for hemoglobin < 7  - Folate and B12 levels WNL  - Outpatient workup     #Morbid obesity  Plan:  - no active plan while inpatient     #Hypomagnesemia  Plan:  - Monitor on labs  - Replete as needed    Both parents updated today. Mom is home ill with  fevers and symptoms similar to [place].  Dad is here at bedside.  They share both physical and legal custody and do appear to be updating each other well.     VTE:  Heparin  Ulcer: H2 Antagonist  Lines: Thomas Catheter  Ongoing indication addressed    I have performed a physical exam and reviewed and updated ROS and Plan today (3/29/2025). In review of yesterday's note (3/28/2025), there are no changes except as documented above.     Discussed patient condition and risk of morbidity and/or mortality with RN, RT, and Pharmacy  The patient remains critically ill.  Critical care time = 60 minutes in directly providing and coordinating critical care and extensive data review.  No time overlap and excludes procedures.    Evi Calderon MD RD  Pulmonary and Critical Care    Available on Voalte

## 2025-03-29 NOTE — PROGRESS NOTES
12-hour chart check complete.    Monitor Summary  Rhythm: SR  Rate: 67  Ectopy: none  Measurements: 0.17/0.08/0.44

## 2025-03-29 NOTE — PROGRESS NOTES
4 Eyes Skin Assessment Completed by JIMMY Borges and Donna DUQUE RN.    Head WDL  Ears WDL  Nose WDL  Mouth Ulcer(s) tongue, cracking  Neck Redness skin tags  Breast/Chest WDL  Shoulder Blades WDL  Spine WDL  (R) Arm/Elbow/Hand WDL  (L) Arm/Elbow/Hand WDL  Abdomen WDL  Groin Redness and Excoriation  Scrotum/Coccyx/Buttocks Redness and Blanching  (R) Leg WDL  (L) Leg WDL  (R) Heel/Foot/Toe WDL  (L) Heel/Foot/Toe WDL          Devices In Places ECG, Blood Pressure Cuff, Pulse Ox, Thomas, SCD's, ET Tube, and OG/NG      Interventions In Place Sacral Mepilex, TAP System, Pillows, Q2 Turns, and Low Air Loss Mattress    Possible Skin Injury Yes    Pictures Uploaded Into Epic Yes  Wound Consult Placed Yes  RN Wound Prevention Protocol Ordered Yes

## 2025-03-29 NOTE — PROCEDURES
Nephrology/Hemodialysis note    Patient admitted with PNA, shock, YUKO - initiated RRT  Seen and examined during dialysis  Intubated on vent  No improvement in UOP  Still with high FiO2 requirements  Lab results reviewed -electrolytes well controlled  Please see dialysis flow sheet for details

## 2025-03-29 NOTE — PROGRESS NOTES
Critical Care Progress Note    19M with resp failure and septic shock due to Group A Strep, mycoplasma and parainfluenza and concern for GAS toxic shock syndrome.    Continues on APVCMV 330/28/14/60%, he is synchronous with vent  ABG at 4pm 7.411/39/76/24.6  P/F 126  Pplat 31  Driving pressure 17    CXR this AM with bilateral pleural effusions and bilateral infiltrates    Bedside POCUS this PM very technically difficult and shows bilateral consoldated lung and minimal bilateral pleural effusions    Meds:  - doxy  - linezolid  - zosyn  - IVIG  - heparin ppx  - famotidine ppx    A/P:    #AHRF 2/2 GAS, mycoplasma, parainfluenza  #Bilateral pleural effusions  #Anuric renal failure  - start hydrocortisone 50mg q6h for severe CAP per CAPE-COD   - start low dose fixed propofol @20mg/hr and wean versed gtt  - continue PEEP 14   - concentrate levophed gtt  - aggressive fluid removal with HD    Family updated at bedside. Critical care time = 61 minutes    Alan Fallon MD  Pulmonary and Critical Care Medicine  [facility]

## 2025-03-29 NOTE — PROGRESS NOTES
Critical Care Progress Note    19M with resp failure and septic shock due to Group A Strep, mycoplasma and parainfluenza and concern for GAS toxic shock syndrome; also with anuric renal failure and fluid overload.    Today on ventilator APVCMV 330/28/14/70%, he is synchronous with vent  ABG at 4pm 7.362/48.6/61/27.6  P/F 87  Pplat 25  Driving pressure 11    CXR this AM again with bilateral pleural effusions and bilateral infiltrates, worsened R sided infiltrates    Meds:  - doxy  - linezolid  - zosyn  - IVIG  - IV hydrocort 50 q6h  - heparin ppx  - famotidine ppx    A/P:    #AHRF 2/2 GAS, mycoplasma, parainfluenza  #Bilateral pleural effusions  #Anuric renal failure  - prone patient @ 4:30pm, supinate at 7am tomorrow  - continue hydrocortisone 50mg q6h for severe CAP per CAPE-COD   - continue low dose fixed propofol @20mg/hr; he has been weaned off versed gtt  - continue PEEP 14   - s/p 2.5L taken off with HD today, consider CRRT tomorrow    Family updated at bedside and risks/benefits of proning discussed. Patient proned successfully without hemodynamic compromise. Critical care time = 50 minutes    Alan Fallon MD  Pulmonary and Critical Care Medicine  [facility]

## 2025-03-29 NOTE — PROGRESS NOTES
Infectious Disease Progress Note    Author: Lia Roblero M.D. Date & Time of service: 3/29/2025  2:01 PM    Chief Complaint:  Septic shock, pneumonia     Interval History:      Review of Systems:  Review of Systems   Unable to perform ROS: Intubated       Hemodynamics:  Temp (24hrs), Av.9 °C (100.3 °F), Min:37.4 °C (99.3 °F), Max:38.4 °C (101.1 °F)  Temperature: 37.4 °C (99.3 °F), Monitored Temp: 37.2 °C (99 °F)  Pulse  Av.1  Min: 62  Max: 110   Blood Pressure: 90/46       Physical Exam:  Physical Exam  Cardiovascular:      Rate and Rhythm: Normal rate.   Pulmonary:      Effort: Pulmonary effort is normal.   Abdominal:      General: Abdomen is flat. There is distension.   Musculoskeletal:      Right lower leg: Edema present.      Left lower leg: Edema present.   Neurological:      Comments: Intubated and sedated         Meds:    Current Facility-Administered Medications:     heparin    heparin    [COMPLETED] piperacillin-tazobactam **AND** piperacillin-tazobactam    MD ALERT...Pharmacist to Dose IVIG    immune globulin (Gamunex-C) 25 g in empty bag 250 mL IVIG    propofol **AND** Triglycerides Starting now and then Every 3 Days    hydrocortisone sodium succinate PF    NORepinephrine    famotidine **OR** famotidine    linezolid    Pharmacy    acetaminophen    midazolam    midazolam    NS    heparin    heparin    HYDROmorphone **OR** HYDROmorphone    HYDROmorphone    doxycycline    heparin    hydrALAZINE    Respiratory Therapy Consult    senna-docusate **AND** polyethylene glycol/lytes **AND** magnesium hydroxide **AND** bisacodyl    lidocaine    ipratropium-albuterol    Labs:  Recent Labs     25  0250 25  0440 25  0350   WBC 15.8* 12.7* 15.4*   RBC 2.86* 2.71* 2.51*   HEMOGLOBIN 9.9* 9.5* 9.3*   HEMATOCRIT 29.1* 28.6* 27.4*   .7* 105.5* 109.2*   MCH 34.6* 35.1* 37.1*   RDW 51.3* 53.2* 55.4*   PLATELETCT 367 365 366   MPV 9.7 9.6 10.0     Recent Labs     25  0250  03/28/25  0440 03/29/25  0350   SODIUM 135 135 133*   POTASSIUM 4.1 3.5* 4.1   CHLORIDE 98 98 98   CO2 22 25 25   GLUCOSE 93 115* 119*   BUN 20 21 28*     Recent Labs     03/27/25  0250 03/28/25  0440 03/29/25  0350   ALBUMIN 2.4* 2.4* 2.4*   TBILIRUBIN 0.8 0.6 0.4   ALKPHOSPHAT 66 83 86   TOTPROTEIN 6.2 6.3 7.2   ALTSGPT 40 30 30   ASTSGOT 35 38 51*   CREATININE 5.78* 5.86* 5.57*       Imaging:  DX-CHEST-PORTABLE (1 VIEW)  Result Date: 3/28/2025  3/28/2025 7:30 AM HISTORY/REASON FOR EXAM:  Shortness of Breath. TECHNIQUE/EXAM DESCRIPTION AND NUMBER OF VIEWS: Single portable view of the chest. COMPARISON: 3/27/2025 FINDINGS: Increased right lung hazy opacification. Stable left lung hazy opacification. Body habitus limits the exam. Cardiac silhouette remains normal size. Endotracheal tube appears adequate. Nasogastric tube has been removed.     1. Limited exam second to body habitus. 2. Increased right lung airspace disease and pleural fluid. 2. Stable left-sided airspace disease and pleural fluid.    DX-CHEST-PORTABLE (1 VIEW)  Result Date: 3/27/2025  3/27/2025 8:23 PM HISTORY/REASON FOR EXAM: Shortness of breath TECHNIQUE/EXAM DESCRIPTION AND NUMBER OF VIEWS: Single portable view of the chest. COMPARISON: 3/27/2025 FINDINGS: There are multiple supportive devices again seen, stable. There are bilateral interstitial infiltrates, left greater than right. There is small bilateral pleural effusion. The heart is normal in size.     Again seen bilateral interstitial infiltrates, left greater than right.    EC-ECHOCARDIOGRAM COMPLETE W/ CONT  Result Date: 3/27/2025  Transthoracic Echo Report Echocardiography Laboratory CONCLUSIONS No prior study is available for comparison. Technically difficult but adequate study for interpretation due to poor acoustic windows. Normal left ventricular systolic function. The left ventricular ejection fraction is visually estimated to be 60- 65%. Grossly normal right ventricular size and  systolic function. ANNETTE IBARRA Exam Date:         2025                    15:07 Exam Location:     Out Patient Priority:          Routine Ordering Physician:        SAADIA MORFIN Referring Physician: Sonographer:               Viet Spears Age:    19     Gender:    M MRN:    [id number] :    2005 BSA:    2.15   Ht (in):    62     Wt (lb):    263 Exam Type:     Complete Indications:     Heart Failure, Systolic ICD Codes:       428.2 CPT Codes:       22723,  BP:   108    /   52     HR:   83 Technical Quality:       Technically difficult study -                          adequate information is obtained MEASUREMENTS  (Male / Female) Normal Values 2D ECHO LV Diastolic Diameter PLAX        4 cm                  4.2 - 5.9 / 3.9 - 5.3 cm LV Systolic Diameter PLAX         2.1 cm                2.1 - 4.0 cm IVS Diastolic Thickness           0.97 cm               LVPW Diastolic Thickness          1 cm                  LVOT Diameter                     2.1 cm                LV Ejection Fraction MOD BP       68.1 %                >= 55  % LV Ejection Fraction MOD 4C       67.9 %                LV Ejection Fraction MOD 2C       70.4 %                IVC Diameter                      2 cm                  DOPPLER AV Peak Velocity                  1.7 m/s               AV Peak Gradient                  11.1 mmHg             AV Mean Gradient                  5.2 mmHg              Mitral E Point Velocity           0.95 m/s              Mitral E to A Ratio               1.7                   MV Pressure Half Time             51 ms                 MV Area PHT                       4.3 cm2               MV Deceleration Time              176 ms                PV Peak Velocity                  1.5 m/s               PV Peak Gradient                  9.6 mmHg              PV Mean Gradient                  5.2 mmHg              RVOT Peak Velocity                1.4 m/s               * Indicates values subject to  auto-interpretation LV EF:        % FINDINGS Left Ventricle Normal left ventricular chamber size. Normal left ventricular wall thickness. Normal left ventricular systolic function. The left ventricular ejection fraction is visually estimated to be 60-65%. Normal regional wall motion. Normal diastolic function. 3mL of contrast was used to enhance visualization of the endocardial border. Existing IV was used at the right AC. Right Ventricle Grossly normal right ventricular size and systolic function. Right Atrium The right atrium is not well visualized. Patient on a ventilator. Left Atrium The left atrium is not well visualized. Mitral Valve Structurally normal mitral valve. No mitral stenosis. Trace mitral regurgitation. Aortic Valve The aortic valve is not well visualized. No aortic valve stenosis. Trace aortic insufficiency. Tricuspid Valve The tricuspid valve is not well visualized. No tricuspid stenosis. Trace tricuspid regurgitation. Unable to estimate pulmonary artery pressure due to an inadequate tricuspid regurgitant jet. Pulmonic Valve The pulmonic valve is not well visualized. No pulmonic stenosis. Trace pulmonic insufficiency. Pericardium No pericardial effusion. Aorta Normal aortic root for body surface area. The ascending aorta diameter is 2.1 cm. Nomi Jones MD (Electronically Signed) Final Date:     27 March 2025                 17:38    DX-CHEST-PORTABLE (1 VIEW)  Result Date: 3/27/2025  3/27/2025 1:59 PM HISTORY/REASON FOR EXAM: . Respiratory failure TECHNIQUE/EXAM DESCRIPTION AND NUMBER OF VIEWS: Single portable view of the chest. COMPARISON: 3/26/2025 FINDINGS: Endotracheal tube and gastric tube are again noted. The cardiomediastinal silhouette is stable. There is diffuse interstitial infiltrate with small bilateral pleural effusions. The appearance is suggestive of pulmonary edema. Underlying infection cannot be excluded. When compared with the prior exam, there is increased opacity at the lung  bases. No pneumothorax is evident.     Persistent bilateral infiltrates and small effusions with increased opacity at the lung bases.    DX-CHEST-PORTABLE (1 VIEW)  Result Date: 3/26/2025  3/26/2025 4:46 AM HISTORY/REASON FOR EXAM:  For indication of respiratory failure. TECHNIQUE/EXAM DESCRIPTION AND NUMBER OF VIEWS: Single portable view of the chest. COMPARISON: 3/25/2025 FINDINGS: Stable large amount of bilateral airspace disease, greatest on the left. Cardiac silhouette is normal size. Endotracheal tube and nasogastric tube appear adequate.     Stable large amount of bilateral airspace disease, greatest on the left.    CT-SOFT TISSUE NECK WITH  Result Date: 3/25/2025  3/25/2025 9:27 AM HISTORY/REASON FOR EXAM:  Group A Strep. TECHNIQUE/EXAM DESCRIPTION AND NUMBER OF VIEWS:  CT soft tissue neck with contrast. The study was performed on a helical multidetector CT scanner. Contiguous thin section helical images were obtained of the neck from the skull base through the thoracic inlet. 100 mL of Omnipaque 350 nonionic contrast was injected intravenously. Low dose optimization technique was utilized for this CT exam including automated exposure control and adjustment of the mA and/or kV according to patient size. COMPARISON: None. FINDINGS: Examination limited by artifact. Endotracheal tube terminates above the johny. Enteric tube projects over the stomach. BRAIN: Visualized portions of the brain are normal in appearance. TEMPORAL bones: The mastoids and middle ears are opacified. PARANASAL SINUSES: Paranasal sinuses are partially opacified. CERVICAL spine: There is no significant cervical spine abnormality. NODES: Bilateral enlarged cervical lymph nodes. 1.6 cm short axis and 1.0 cm short axis left level 2 lymph nodes and 1.1 cm short axis left level 3 lymph node. 1.3 cm short axis right level 2 and 0.9 cm short axis right level 3 lymph node. SALIVARY and THYROID gland: The parotid, submandibular, and thyroid gland  are normal in appearance. AIRWAY: Upper airway partially obscured by ET tube and enteric tube. No definite peritonsillar abscess identified. MEDIASTINUM: Multiple enhancing lymph nodes within the superior mediastinum measuring up to 0.9 cm short axis. LUNGS: Right upper lobe patchy parenchymal opacities in left upper lobe consolidation. Small/moderate left pleural effusion.     1.  Examination limited by artifact. 2.  Bilateral cervical adenopathy. Superior mediastinal adenopathy. 3.  Upper airway is partially obscured by ET tube and enteric tube. No definite peritonsillar abscess identified. 4.  Opacified mastoids and middle ears. Inflammatory changes not excluded. 5.  Partially opacified paranasal sinuses. After changes not excluded. 6.  Patchy parenchymal opacities right upper lobe and consolidation left upper lobe.    CT-ABDOMEN-PELVIS W/O  Result Date: 3/25/2025  3/25/2025 9:27 AM HISTORY/REASON FOR EXAM:  Sepsis of unknown origin. TECHNIQUE/EXAM DESCRIPTION: CT scan of the abdomen and pelvis without contrast. Noncontrast helical scanning was obtained from the diaphragmatic domes through the pubic symphysis. Low dose optimization technique was utilized for this CT exam including automated exposure control and adjustment of the mA and/or kV according to patient size. COMPARISON: None. FINDINGS: Examination limited by artifact. Lower Chest: Consolidation within both lung bases and pleural effusions.. Liver: Diffuse low-attenuation liver consistent with fatty change.. Spleen: Spleen is within normal limits in size.. Pancreas: No pancreatic mass identified.. Gallbladder: No calcified stones. Biliary: Nondilated. Adrenal glands: No adrenal gland mass identified.. Kidneys: No hydronephrosis or urolithiasis. Bowel: No evidence of bowel obstruction or acute appendicitis.. Lymph nodes: Numerous small retroperitoneal lymph nodes. Numerous subcentimeter mesenteric lymph nodes.. Vasculature: Unremarkable. Peritoneum:  Unremarkable without ascites. Pelvis: Catheter extends into a decompressed urinary bladder. Right femoral line projects into the right common and iliac vein. 10.4 mm short axis left inguinal lymph node and and 9.7 mm short axis right inguinal lymph node. Musculoskeletal: No acute or destructive process. No pelvic free fluid     1.  Examination limited by artifact. 2.  No evidence of bowel obstruction or acute appendicitis. No free fluid 3.   Hepatic steatosis. 4.  Bilateral lung base consolidation and pleural effusions.    CT-CTA CHEST PULMONARY ARTERY W/ RECONS  Result Date: 3/25/2025  3/25/2025 9:27 AM HISTORY/REASON FOR EXAM:  Respiratory Failure, possible PE TECHNIQUE/EXAM DESCRIPTION: CT angiogram scan for pulmonary embolism with contrast, with reconstructions. 1.25 mm helical sections were obtained from the lung apices through the lung bases following the rapid bolus administration of 100 mL of Omnipaque 350 nonionic contrast. Thin-section overlapping reconstruction interval was utilized. Coronal reconstructions were generated from the axial data. MIP post processing was performed and utilized for the interpretation. Low dose optimization technique was utilized for this CT exam including automated exposure control and adjustment of the mA and/or kV according to patient size. COMPARISON: None FINDINGS: Examination limited by artifact. Pulmonary Embolism: No. Main Pulmonary Arteries: No. Segmental branches: No. Subsegmental branches: Obscured by artifact. Additional Comments: Cardiomegaly. No fluid collections. The aorta and branch vessels faintly opacified.. No aortic aneurysm identified. Lungs: Airspace opacities right lower lobe. Patchy opacities posterior right upper lobe. Airspace opacities left upper lobe including the lingula and left lower lobe.. The left lower lobe is almost completely opacified. Pleura: Small right pleural effusion. Small/moderate left pleural effusion.. Nodes: Limited evaluation..  Additional findings: Endotracheal tube terminates above the johny. Enteric tube projects over the stomach..     1.  Examination limited by artifact. 2.  No proximal pulmonary artery emboli identified. 3.  Bilateral airspace opacities consistent with atelectasis/consolidative pneumonia. 4.  Small right and small/moderate left pleural effusions.     DX-CHEST-PORTABLE (1 VIEW)  Result Date: 3/25/2025  3/25/2025 5:31 AM HISTORY/REASON FOR EXAM:  For indication of respiratory failure. TECHNIQUE/EXAM DESCRIPTION AND NUMBER OF VIEWS: Single portable view of the chest. COMPARISON: 3/24/2025 FINDINGS: Endotracheal tube and nasogastric tube appear adequate. Increased bilateral airspace disease, greatest on the left. Body habitus limits evaluation. Cardiac silhouette is stable.     Increasing bilateral airspace disease, greatest on the left.    [id number] VIEW  Result Date: 3/24/2025  3/24/2025 2:27 PM HISTORY/REASON FOR EXAM: NG tube placement TECHNIQUE/EXAM DESCRIPTION AND NUMBER OF VIEWS: 1 supine views of the abdomen. COMPARISON: None FINDINGS: There is no evidence of bowel obstruction. NG tube tip overlies the gastric body. No abnormal calcifications are seen.     NG tube tip overlies the gastric body.    DX-CHEST-PORTABLE (1 VIEW)  Result Date: 3/24/2025  3/24/2025 2:27 PM HISTORY/REASON FOR EXAM: Post intubation TECHNIQUE/EXAM DESCRIPTION AND NUMBER OF VIEWS: Single portable view of the chest. COMPARISON: 3/24/2025 from another institution. FINDINGS: There is an endotracheal tube with tip located above the level of the clavicles. NG tube extends into the stomach. There is interval improvement in aeration of the left lung. There remains consolidation and volume loss. There is no pleural effusion. The heart is enlarged.     1.  Interval improvement in aeration of the left lung. There does remain volume loss and consolidation. 2.  Cardiomegaly. 3.  Endotracheal tube tip located just above the level of the  clavicles.      Micro:  Results       Procedure Component Value Units Date/Time    BLOOD CULTURE [[id number]] Collected: 03/28/25 1100    Order Status: Completed Specimen: Blood from Peripheral Updated: 03/28/25 1419     Significant Indicator NEG     Source BLD     Site PERIPHERAL     Culture Result No Growth  Note: Blood cultures are incubated for 5 days and  are monitored continuously.Positive blood cultures  are called to the RN and reported as soon as  they are identified.  Blood culture testing and Gram stain, if indicated, are  performed at [facility],  [address] [place] [address].  Positive blood  cultures are sent to [facility],  [address] [place] [address], for organism identification  and susceptibility testing.      BLOOD CULTURE [[id number]] Collected: 03/28/25 1137    Order Status: Completed Specimen: Blood from Peripheral Updated: 03/28/25 1419     Significant Indicator NEG     Source BLD     Site PERIPHERAL     Culture Result No Growth  Note: Blood cultures are incubated for 5 days and  are monitored continuously.Positive blood cultures  are called to the RN and reported as soon as  they are identified.  Blood culture testing and Gram stain, if indicated, are  performed at [facility],  [address] [place] [address].  Positive blood  cultures are sent to [facility],  [address] [place] [address], for organism identification  and susceptibility testing.      GRAM STAIN [[id number]] Collected: 03/24/25 1745    Order Status: Completed Specimen: Respirate Updated: 03/27/25 2212     Significant Indicator .     Source RESP     Site BAL     Gram Stain Result Many WBCs.  No organisms seen.      Fungal Smear [[id number]] Collected: 03/24/25 1745    Order Status: Completed Specimen: Respirate Updated: 03/27/25 2212     Significant Indicator NEG     Source RESP     Site BAL     Fungal Smear Results No fungal  elements seen.    Fungal Culture [[id number]] Collected: 03/24/25 1745    Order Status: Completed Specimen: Respirate Updated: 03/27/25 2211     Significant Indicator NEG     Source RESP     Site BAL     Culture Result Culture in progress.     Fungal Smear Results No fungal elements seen.    CULTURE RESPIRATORY W/ GRM STN [[id number]] Collected: 03/24/25 1745    Order Status: Completed Specimen: Respirate Updated: 03/27/25 2211     Significant Indicator NEG     Source RESP     Site BAL     Culture Result No growth at 48 hours.     Gram Stain Result Many WBCs.  No organisms seen.      AFB CULTURE AND STAIN (ACID-FAST BACILLUS) [[id number]] Collected: 03/24/25 1745    Order Status: Completed Updated: 03/27/25 1723     AFB Smear Results SEE NOTE     Comment: Negative for Acid Fast Bacteria.  Less than 5 mL of specimen received.  A minimum of 5 mL of sputum or fluid is recommended  for recovery of acid fast bacilli (AFB).  Volumes less than 5 mL are suboptimal and may  compromise recovery of AFB from culture.          Preliminary Report SEE NOTE     Comment: Specimen received and in progress.  Positive culture results are reported as soon as detected.  Final report to follow in seven to eight weeks  Performed By: [facility]  [address] [place] [id number]  : Yevgeniy Shafer MD, PhD  CLIA Number: [id number]         Respiratory Panel by PCR (Inpatient ONLY) [[id number]]  (Abnormal) Collected: 03/25/25 0647    Order Status: Completed Specimen: Respirate from Nasopharyngeal Updated: 03/25/25 1419     Adenovirus, PCR Not Detected     SARS-CoV-2 (COVID-19) RNA by DEVI NotDetected     Comment: [facility] providers: PLEASE REFER TO DE-ESCALATION AND RETESTING PROTOCOL  on [email or web address]    **The [employer] Respiratory Panel 2.1 (RP2.1) RT-PCR test is FDA authorized.          Coronavirus 229E, PCR Not Detected     Coronavirus HKU1, PCR Not Detected     Coronavirus NL63, PCR Not Detected      Coronavirus OC43, PCR Not Detected     Human Metapneumovirus, PCR Not Detected     Rhino/Enterovirus, PCR Not Detected     Influenza A, PCR Not Detected     Influenza B, PCR Not Detected     Parainfluenza 1, PCR Not Detected     Parainfluenza 2, PCR Not Detected     Parainfluenza 3, PCR DETECTED     Parainfluenza 4, PCR Not Detected     RSV (Respiratory Syncytial Virus), PCR Not Detected     Bordetella parapertussis (OV7272), PCR Not Detected     Bordetella pertussis (ptxP), PCR Not Detected     Mycoplasma pneumoniae, PCR DETECTED     Chlamydia pneumoniae, PCR Not Detected    Group A Strep by PCR [[id number]]  (Abnormal) Collected: 03/25/25 0739    Order Status: Completed Specimen: Throat Updated: 03/25/25 0820     Group A Strep by PCR DETECTED    URINALYSIS [[id number]]  (Abnormal) Collected: 03/25/25 0705    Order Status: Completed Specimen: Urine, Thomas Cath Updated: 03/25/25 0805     Color Dark Yellow     Character Turbid     Specific Gravity 1.024     Ph 5.0     Glucose 100 mg/dL      Ketones Trace mg/dL      Protein 30 mg/dL      Bilirubin Small     Urobilinogen, Urine 1.0 EU/dL      Nitrite Negative     Leukocyte Esterase Moderate     Occult Blood Large     Micro Urine Req Microscopic    MRSA By PCR (Amp) [[id number]] Collected: 03/24/25 1700    Order Status: Completed Specimen: Respirate from Nares Updated: 03/24/25 2211     MRSA by PCR Negative    BLOOD CULTURE [[id number]] Collected: 03/24/25 1700    Order Status: Completed Specimen: Blood from Peripheral Updated: 03/24/25 2019     Significant Indicator NEG     Source BLD     Site PERIPHERAL     Culture Result No Growth  Note: Blood cultures are incubated for 5 days and  are monitored continuously.Positive blood cultures  are called to the RN and reported as soon as  they are identified.  Blood culture testing and Gram stain, if indicated, are  performed at [facility],  [address] [place] [address].  Positive blood  cultures  are sent to [facility],  [address] [place] [address], for organism identification  and susceptibility testing.      BLOOD CULTURE [[id number]] Collected: 03/24/25 1700    Order Status: Completed Specimen: Blood from Peripheral Updated: 03/24/25 2019     Significant Indicator NEG     Source BLD     Site PERIPHERAL     Culture Result No Growth  Note: Blood cultures are incubated for 5 days and  are monitored continuously.Positive blood cultures  are called to the RN and reported as soon as  they are identified.  Blood culture testing and Gram stain, if indicated, are  performed at [facility],  [address] [place] [address].  Positive blood  cultures are sent to [facility],  [address] [place] [address], for organism identification  and susceptibility testing.      CULTURE RESPIRATORY W/ GRM STN [[id number]] Collected: 03/24/25 1700    Order Status: Canceled Specimen: Other from Bronchoalveolar Lavage     AFB Culture [[id number]] Collected: 03/24/25 1700    Order Status: Sent Specimen: Respirate from Bronchoalveolar Lavage     Fungal Culture [[id number]] Collected: 03/24/25 1700    Order Status: Canceled Specimen: Other from Bronchoalveolar Lavage             Assessment:  Active Hospital Problems    Diagnosis     *Respiratory failure (HCC) [J96.90]      Interval 24 hours:      AF, O2 Vent 14/70%, pressors weaning off   Labs reviewed  [facility] personally reviewed both images and report.  Studies reviewed  Micro reviewed  Notes from primary team and specialists reviewed    Pt remains intubated on significant oxygen requirements, he has been afebrile today and some improvement in pressor requirements, started on high-dose steroids    Antibiotics as below.       ASSESSMENT/PLAN:      19 y.o.  admitted 3/24/2025. Pt has a past medical history of Down syndrome and obesity.  He came to the ER with complaints of shortness of breath and was intubated there.   Per report he had had cough and fever prior to coming in as well as diarrhea and nausea.     Hospital Course:   Patient remains intubated on high flow oxygen in the ICU, and has required pressor support, ongoing low-grade fevers and leukocytosis.  Respiratory viral panel positive for mycoplasma pneumonia and parainfluenza.   Group A strep also positive.     Problem List     Septic shock, continues to require pressor support  Fevers, improved from admit but still ongoing  VDRF  Pneumonia  -Respiratory PCR +group A strep, mycoplasma pneumoniae and parainfluenza 3  Concern for streptococcal toxic shock syndrome given symptoms prior to admit, ongoing fevers, transaminitis on arrival, kidney failure and concern for ARDS  YUKO, anuric, continues to require CRRT  Down syndrome  Obesity     Plan:     --- Continue renally dosed Zosyn, will trial for a few days to see if this makes any difference  --- Continue p.o. linezolid  --- Continue IV doxycycline, will give 14-day course  --- Recommend trial IVIG x 3 days, discussed with pharmacy, started on 3/28  --- F/up blood cultures  --- Follow-up BAL cultures, no growth to date  --- Monitor kidney function, renally dose medications        Dispo: Awaiting culture results and work-up as above.  Patient is at risk for infectious complications including death.     PICC: TBD        Plan of care discussed with HANNAH Calderon M.D.. Will continue to follow     Lia Roblero M.D.

## 2025-03-29 NOTE — PROGRESS NOTES
[facility] Progress Note     Hemodialysis treatment ordered today per Dr. Radha Lisa x 3 hours.   Remains intubated -MV at 100% fio2; APVcmv mode; on sedation; on vasopressor x 1; anuric. Right femoral non tunneled CVC Red port sluggish, ran tx with lines reversed without issues .  Dr. Radha Lisa nephrologist rounded ; okay to increase net UF 3.3-3.5 L as bp tolerated  Treatment initiated at 0653 completed at 0953.      Patient tolerated treatment; see electronic flow sheet for details.      Net UF 3300 mL.      Post tx, CVC flushed with saline then locked with heparin 1000 units/mL (1.4 mL at RED port and 1.4 mL at BLUE port) then clamped and capped. Aspirate heparin prior to next CVC use.     Report given to Primary RN Katerina Connelly .

## 2025-03-29 NOTE — PROGRESS NOTES
12-hour chart check complete.    Monitor Summary  Rhythm: SR  Rate: 77-92  Ectopy: -  Measurements: .16/.08/.34

## 2025-03-29 NOTE — PROGRESS NOTES
4 Eyes Skin Assessment Completed by JIMMY Stratton and Donna MARTINEZ RN.    Head WDL  Ears WDL  Nose WDL  Mouth Discoloration, Cracking, Dry, possible ulcer  Neck Redness and Blanching, Skin tags  Breast/Chest WDL  Shoulder Blades WDL  Spine WDL  (R) Arm/Elbow/Hand Edema  (L) Arm/Elbow/Hand Edema  Abdomen WDL  Groin Redness and Blanching, skin tags  Scrotum/Coccyx/Buttocks Redness and Blanching  (R) Leg Edema  (L) Leg Edema  (R) Heel/Foot/Toe WDL  (L) Heel/Foot/Toe WDL          Devices In Places ECG, Blood Pressure Cuff, Pulse Ox, Thomas, SCD's, ET Tube, OG/NG, and Central Line      Interventions In Place TAP System, Pillows, Q2 Turns, Low Air Loss Mattress, Barrier Cream, ZFlo Pillow, and Heels Loaded W/Pillows    Possible Skin Injury Yes    Pictures Uploaded Into Epic Yes  Wound Consult Placed Yes  RN Wound Prevention Protocol Ordered Yes

## 2025-03-29 NOTE — WOUND TEAM
[facility]  Inpatient Services  Initial Wound and Skin Care Evaluation    Admission Date: 3/24/2025     Last order of IP CONSULT TO WOUND CARE was found on 3/28/2025 from Hospital Encounter on 3/24/2025     HPI, PMH, SH: Reviewed    No past surgical history on file.  Social History     Tobacco Use    Smoking status: Not on file    Smokeless tobacco: Not on file   Substance Use Topics    Alcohol use: Not on file     No chief complaint on file.    Diagnosis: Respiratory failure (HCC) [J96.90]    Unit where seen by Wound Team: 3318/00     WOUND CONSULT RELATED TO:  Tongue    WOUND TEAM PLAN OF CARE - Frequency of Follow-up:   Nursing to follow dressing orders written for wound care. Contact wound team if area fails to progress, deteriorates or with any questions/concerns if something comes up before next scheduled follow up (See below as to whether wound is following and frequency of wound follow up)   Weekly - Tongue    WOUND HISTORY:   Patient transferred to [facility] from [facility] with respiratory failure.  Patient has history of Down Syndrome, obesity.  During his admit he has become anuric, pneumonia, renal failure, vented, and sedated.         WOUND ASSESSMENT/LDA  Wound 03/28/25 Pressure Injury Mouth;Other (Comment) Tongue indeterminable (MDRPI) (Active)   Date First Assessed/Time First Assessed: 03/28/25 0715   Present on Original Admission: No  Hand Hygiene Completed: Yes  Primary Wound Type: Pressure Injury  Location: (c) Mouth;Other (Comment)  Wound Description (Comments): Tongue indeterminable (MDRPI)      Assessments 3/29/2025  2:00 PM   Wound Image       Site Assessment Edema;Black;Yellow;Red   Periwound Assessment Red;Edema   Margins Defined edges;Attached edges   Closure Open to air   Drainage Amount None   Treatments Cleansed;Nonselective debridement;Site care;Offloading   Periwound Protectant Not Applicable   Dressing Status Open to Air   NEXT Weekly Photo (Inpatient Only)  04/02/25   Wound Team Following Weekly   Pressure Injury Stage Indeterminable   Wound Length (cm) 1 cm   Wound Width (cm) 2.3 cm   Wound Surface Area (cm^2) 2.3 cm^2   Shape Elim IRA/linear   WOUND NURSE ONLY - Time Spent with Patient (mins) 60        Vascular:    RASHEED:   No results found.    Lab Values:    Lab Results   Component Value Date/Time    WBC 15.4 (H) 03/29/2025 03:50 AM    RBC 2.51 (L) 03/29/2025 03:50 AM    HEMOGLOBIN 9.3 (L) 03/29/2025 03:50 AM    HEMATOCRIT 27.4 (L) 03/29/2025 03:50 AM    CREACTPROT 38.30 (H) 03/27/2025 02:50 AM         Culture Results show:  No results found for this or any previous visit (from the past 720 hours).    Pain Level/Medicated:   MIREILLE        INTERVENTIONS BY WOUND TEAM:  Chart and images reviewed. Discussed with bedside RN. All areas of concern (based on picture review, LDA review and discussion with bedside RN) have been thoroughly assessed. Documentation of areas based on significant findings. This RN in to assess patient. Performed standard wound care which includes appropriate positioning, dressing removal and non-selective debridement. Pictures and measurements obtained weekly if/when required.    Wound:  Tongue  Cleansed/Non-selectively Debrided with:  Moist warm washcloth  Kat wound: Cleansed with Moist warm washcloth, Prepped with N/A  Primary Dressing:  Oral hygiene  Secondary (Outer) Dressing: offloading ET tube     Advanced Wound Care Discharge Planning  Number of Clinicians necessary to complete wound care: 1  Is patient requiring IV pain medications for dressing changes:  No   Length of time for dressing change 25 min. (This does not include chart review, pre-medication time, set up, clean up or time spent charting.)    Interdisciplinary consultation: Patient, Bedside RN (Katerina), N/A.  Pressure injury and staging reviewed with Flaca WALTER (Wound RN) and Ina FREITAS (Wound PT).    EVALUATION / RATIONALE FOR TREATMENT:     Date:  03/29/25  Wound Status:  Initial  evaluation    Patient has been ventilated since arrival to [facility] and tongue was intact until four days later.  ET tube, medical device related pressure injury noted on the tongue which is considered an indeterminable.  Recommend more frequent rotations of ET Tube and/or bite block.      Bilateral heels are intact    Sacrum not assessed at this time, but per 4 eyes skin notes red and blanching.           Goals: Steady decrease in wound area and depth weekly.    NURSING PLAN OF CARE ORDERS:  Dressing changes: See Dressing Care orders  Skin care: See Skin Care orders  RN Prevention Protocol    NUTRITION RECOMMENDATIONS   Wound Team Recommendations:  Dietary consult    DIET ORDERS (From admission to next 24h)       Start     Ordered    03/26/25 1438  Diet: Diet Tube Feed; Formula: RTH Novasource Renal; Goal Rate (mL/Hour): 50; Duration: 24 HR  ALL MEALS        Comments: Start at 25mL/hr. Advance as tolerated to a goal rate of 50 mL/hour   Question Answer Comment   Diet Diet Tube Feed    Formula: RTH Novasource Renal    Goal Rate (mL/Hour) 50    Route NG    Duration 24 HR        03/26/25 1438    03/24/25 2010  Diet NPO - Okay to give medications through NG/OG  ALL MEALS        Comments: Okay to give medications through NG/OG   Question:  Clamp Time  Answer:  30 minutes    03/24/25 2010                    PREVENTATIVE INTERVENTIONS:    Q shift [place] - performed per nursing policy  Q shift pressure point assessments - performed per nursing policy    Surface/Positioning  ICU Low Airloss - Currently in Place  Reposition q 2 hours - Currently in Place  TAPs Turning system - Currently in Place    Offloading/Redistribution  Heel offloading dressing (Silicone dressing) - Ordered  Heel float boots (Prevalon boot) - Ordered      Respiratory  Anchorfast - Currently in Place    Containment/Moisture Prevention    Thomas Catheter - Currently in Place    Anticipated discharge plans:  TBD        Vac Discharge Needs:  Vac Discharge plan  is purely a recommendation from wound team and not a requirement for discharge unless otherwise stated by physician.  Not Applicable Pt not on a wound vac

## 2025-03-29 NOTE — CARE PLAN
Problem: Skin Integrity  Goal: Skin integrity is maintained or improved  Outcome: Progressing     Problem: Respiratory  Goal: Patient will achieve/maintain optimum respiratory ventilation and gas exchange  Outcome: Progressing     Problem: Hemodynamics  Goal: Patient's hemodynamics, fluid balance and neurologic status will be stable or improve  Outcome: Progressing   The patient is Unstable - High likelihood or risk of patient condition declining or worsening    Shift Goals  Clinical Goals: SpO2>90%, wean FiO2, RASS -5, monitor ETT cuff  Patient Goals: MIREILLE  Family Goals: updates    Progress made toward(s) clinical / shift goals:  pt currently on 70% FIO2 repeating ABG to measure improvement after ventilator changes. Dialysis RN able to remove 3.3L and levo being titrated down.     Patient is not progressing towards the following goals:

## 2025-03-30 NOTE — PROGRESS NOTES
[facility] Services Progress Note     Hemodialysis treatment ordered today per Dr. Radha Lisa x 4 hours.     Treatment initiated at 09:35 end time 13:35.      Patient completed treatment, BP supported by pressor; see electronic flow sheet for details.      Net UF 4000 mL.      Post tx, CVC flushed with saline then locked with heparin 1000 units/mL per designated amount in each wing then clamped and capped. Aspirate heparin prior to next CVC use. Dressing changed per protocol.     Report given to Primary RN Donna Jones.

## 2025-03-30 NOTE — PROGRESS NOTES
"Pulmonary & Critical Care Progress Note    Date of admission  3/24/2025    Chief Complaint  19 y.o. male admitted 3/24/2025 with respiratory failure    Hospital Course  Katey is a 19 year old male with down syndrome and obesity admitted with respiratory complaints and hypoxia who was intubated in the emergency room there.  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  He was initially coughing and had a fever but then became hypoxic to the 70's-80's for a couple days prior to bringing him in. He had some diarrhea and upset stomach for a couple days as well. He was uncooperative with NC oxygen and medical treatment. He is on low dose levophed peripherally which I am told is much improved from prior. He has been treated for a left sided pneumonia with vanc and zosyn and this morning developed a complete \"left lung white out\". The transferring physician is unable to tell me if this is plugging, effusion or pneumonia as he states the patient has not been stable enough to get a CT and they do not have POCUS available. He is on the ventilator with a tidal volume of 340, PEEP 10 and FiO2 of 0.70 with a VBG 7.36/44/65 improved from 7.29/56/63/27. The transferring physician does not feel comfortable placing a central line prior to transfer due to difficulty accessing the IJ. Patient has now become anuric.      On arrival Katey was dysynchonous with the ventilator despite about 10mg of versed and several pushes of ketamine in route, a versed gtt and fentanyl gtt.  He arrived on 3.2 mcg/min of levophed.    3/27 - Vent day 5, HD Day 3, no changes in UOP    3/28 - Vent Day 6, HD #4, increased FiO2 overnight, CXR improved but with decreased volumes    3/29 - Vent Day 7, HD #5, Increased O2 needs to 100% overnight PEEP 14, Driving pressure 17, Plat 31, Peak 38.  Katey has required high PEEP to overcome the weight of his chest and " achieve adequate saturations.  Bronchoscopy yesterday without significant purulence or secretions.  POCUS performed by Dr. Fallon last night without significant effusion but positive for bilateral densely consolidated lung.      3/30 - Vent day 8, HD #6, worsened P:F and increased vent setting post proning yesterday.  Off levophed at midnight likely due to prior versed wearing off.  Significant instability during supination despite pushes of versed and propofol and ultimately vecuronium. Unable to obtain ABG prior to supination. ABG after is dismal   7.36/45/57/25 on 100% FiO2 and PEEP of 16 s/p multiple recruitment maneuvers. Significant diarrhea overnight requiring placement of BMS.  CXR this morning with improved right lung infiltrate.  He did have significant postural drainage of clear sputum last night, soaking the pillow and requiring very frequent suctioning and circuit changes.    Interval Problem Update  Reviewed last 24 hour events:  ICU CHECKLIST:  Chart review from the past 24 hours includes imaging, laboratory studies, vital signs and notes available.  Pertinent system review for today's visit includes:  Significant overnight events:     Neuro: Sedated, RASS -4-5 on propofol gtt and fentanyl Triglycerides 171 -> 239   Cardiac: HR 60-70's, BP /40-50 off levophed this am  Pulmonary: APVC 24/330/16/90% -> 7.36/45/57/25 - P/F 64.  Patient supinated at shift change.  Heme: Unchanged.  + Leukocytosis    DVT Prophylaxis: Heparin  /Renal: 19 ml urine output in 24 hours, HD with 3.3L out   I/O: 1879/3819 = -1939 = +3267   Thomas: +  ID:  Afebrile.  MRSA negative, blood and respiratory cultures NGTD, Group A Strep Positive, Mycoplasma pna +, Parainfluenza + on Unasyn (4) -> Zosyn (3), Linezolid (6) and Doxy (6)  Skin: Intact  GI/Nutrition: NPO on tube feeds   Prophylaxis: Pepcid  Endo: No active issues  Additional data:   Lines/Tubes: PIV, Right Fem HD cath      Review of Systems  Review of Systems   Unable  to perform ROS: Intubated        Vital Signs for last 24 hours   Temp:  [36.1 °C (97 °F)-37.6 °C (99.7 °F)] 36.1 °C (97 °F)  Pulse:  [56-87] 56  Resp:  [19-28] 25  BP: ()/(46-61) 104/51  SpO2:  [86 %-98 %] 92 %    Hemodynamic parameters for last 24 hours       Respiratory Information for the last 24 hours  Vent Mode: APVCMV  Rate (breaths/min): 24  Vt Target (mL): 330  PEEP/CPAP: 16  MAP: 23  Control VTE (exp VT): 325    Physical Exam   Physical Exam  Constitutional:       Comments: Facial Swelling   HENT:      Mouth/Throat:      Mouth: Mucous membranes are moist.   Cardiovascular:      Rate and Rhythm: Regular rhythm. Bradycardia present.   Pulmonary:      Breath sounds: Rhonchi present. No wheezing or rales.   Abdominal:      General: There is no distension.      Palpations: Abdomen is soft.   Musculoskeletal:         General: No swelling or deformity.   Skin:     General: Skin is warm and dry.   Neurological:      Comments: Sedated to RASS - 5         Medications  Current Facility-Administered Medications   Medication Dose Route Frequency Provider Last Rate Last Admin    heparin injection 500 Units  500 Units Intravenous DIALYSIS PRN Radha Lisa M.D.   500 Units at 03/29/25 0651    piperacillin-tazobactam (Zosyn) 4.5 g in  mL IVPB  4.5 g Intravenous Q12HRS Evi Calderon M.D. 25 mL/hr at 03/30/25 0730 4.5 g at 03/30/25 0730    heparin injection 2,000 Units  2,000 Units Intravenous DIALYSIS PRN Radha Lisa M.D. MD ALERT...Pharmacist to Dose IVIG 1 Each  1 Each Other PHARMACY TO DOSE Lia Roblero M.D.        immune globulin (Gamunex-C) 25 g in empty bag 250 mL IVIG  0.5 g/kg (Ideal) Intravenous DAILY AT NOON Lia Roblero M.D. 30 mL/hr at 03/29/25 1120 25 g at 03/29/25 1120    propofol (DIPRIVAN) injection  20 mcg/kg/min (Ideal) Intravenous Continuous Alan Fallon M.D. 16.4 mL/hr at 03/30/25 0737 50 mcg/kg/min at 03/30/25 0737    hydrocortisone sodium succinate PF  (Solu-CORTEF) injection 50 mg  50 mg Intravenous Q6HRS Alan Fallon M.D.   50 mg at 03/30/25 0502    norepinephrine (Levophed) infusion 32 mg/500 mL (premix)  0-1 mcg/kg/min (Ideal) Intravenous Continuous Alan Fallon M.D.   Stopped at 03/30/25 0029    famotidine (Pepcid) tablet 20 mg  20 mg Enteral Tube DAILY Evi Calderon M.D.        Or    famotidine (Pepcid) injection 20 mg  20 mg Intravenous DAILY Evi Calderon M.D.   20 mg at 03/30/25 0501    linezolid (Zyvox) tablet 600 mg  600 mg Enteral Tube Q12HRS Evi Calderon M.D.   600 mg at 03/30/25 0507    Pharmacy Consult: Enteral tube insertion - review meds/change route/product selection  1 Each Other PHARMACY TO DOSE Evi Calderon M.D.        acetaminophen (Tylenol) tablet 650 mg  650 mg Enteral Tube Q6HRS PRN Evi Calderon M.D.   650 mg at 03/28/25 1328    midazolam (Versed) premix 125 mg/125 mL infusion  0-10 mg/hr Intravenous Continuous Evi Calderon M.D.   Stopped at 03/29/25 0529    midazolam (Versed) injection 5 mg  5 mg Intravenous Q HOUR PRN Evi Calderon M.D.   5 mg at 03/30/25 0712    NS (Bolus) 0.9 % infusion 100 mL  100 mL Intravenous DIALYSIS PRN Radha Lisa M.D.        heparin injection 1,400 Units  1,400 Units Intravenous DIALYSIS PRN Radha Lisa M.D.   1,400 Units at 03/29/25 1001    heparin injection 1,400 Units  1,400 Units Intravenous DIALYSIS PRN Radha Lisa M.D.   1,400 Units at 03/29/25 1000    HYDROmorphone (Dilaudid) injection 1 mg  1 mg Intravenous Q HOUR PRN Evi Calderon M.D.   1 mg at 03/26/25 0432    Or    HYDROmorphone (Dilaudid) injection 2 mg  2 mg Intravenous Q HOUR PRN Evi Calderon M.D.        HYDROmorphone (Dilaudid) 0.2 mg/mL in NS 50mL (Continuous Infusion)   Intravenous Continuous Evi Calderon M.D. 5.5 mL/hr at 03/30/25 0333 1.1 mg/hr at 03/30/25 0333    doxycycline (Vibramycin) 100 mg in  mL IVPB  100 mg Intravenous Q12HRS Lia Roblero M.D.   Stopped at 03/30/25  0605    heparin injection 5,000 Units  5,000 Units Subcutaneous Q8HRS Evi Calderon M.D.   5,000 Units at 03/30/25 0630    hydrALAZINE (Apresoline) injection 10 mg  10 mg Intravenous Q4HRS PRN Evi Calderon M.D.        Respiratory Therapy Consult   Nebulization Continuous RT Evi Calderon M.D.        senna-docusate (Pericolace Or Senokot S) 8.6-50 MG per tablet 2 Tablet  2 Tablet Enteral Tube BID Evi Calderon M.D.   2 Tablet at 03/30/25 0600    And    polyethylene glycol/lytes (Miralax) Packet 1 Packet  1 Packet Enteral Tube QDAY PRN Evi Calderon M.D.   1 Packet at 03/27/25 1434    And    magnesium hydroxide (Milk Of Magnesia) suspension 30 mL  30 mL Enteral Tube QDAY PRN Evi Calderon M.D.        And    bisacodyl (Dulcolax) suppository 10 mg  10 mg Rectal QDAY PRN Evi Calderon M.D.        lidocaine (Xylocaine) 1 % injection 2 mL  2 mL Tracheal Tube Q30 MIN PRN Evi Calderon M.D.        ipratropium-albuterol (DUONEB) nebulizer solution  3 mL Nebulization Q4H PRN (RT) Vidya Louie D.O.   3 mL at 03/26/25 1843       Fluids    Intake/Output Summary (Last 24 hours) at 3/30/2025 0739  Last data filed at 3/30/2025 0600  Gross per 24 hour   Intake 1879.16 ml   Output 3819 ml   Net -1939.84 ml       Laboratory  Recent Labs     03/29/25  1017 03/29/25  1541 03/29/25  1946   ISTATAPH 7.388 7.362 7.349*   ISTATAPCO2 44.2 48.6* 50.3*   ISTATAPO2 79* 61* 58*   ISTATATCO2 28* 29* 29*   CWULCYD4JPP 95 90* 88*   ISTATARTHCO3 26.7 27.6 27.7   ISTATARTBE 1 2 2   ISTATTEMP 37.3 C 37.6 C 37.2 C   ISTATFIO2 100 70 90   ISTATSPEC Arterial Arterial Arterial   ISTATAPHTC 7.384 7.353 7.346*   [id number] 80* 64* 58*         Recent Labs     03/28/25  0440 03/29/25  0350 03/30/25  0355   SODIUM 135 133* 134*   POTASSIUM 3.5* 4.1 3.8   CHLORIDE 98 98 98   CO2 25 25 27   BUN 21 28* 37*   CREATININE 5.86* 5.57* 5.50*   MAGNESIUM 2.0 1.8 1.8   PHOSPHORUS 3.4 3.1 3.5   CALCIUM 8.3* 8.3* 8.8     Recent  Labs     03/28/25  0440 03/29/25  0350 03/30/25  0355   ALTSGPT 30 30 28   ASTSGOT 38 51* 44   ALKPHOSPHAT 83 86 64   TBILIRUBIN 0.6 0.4 0.3   GLUCOSE 115* 119* 84     Recent Labs     03/28/25  0440 03/29/25  0350 03/30/25  0355   WBC 12.7* 15.4* 14.8*   ASTSGOT 38 51* 44   ALTSGPT 30 30 28   ALKPHOSPHAT 83 86 64   TBILIRUBIN 0.6 0.4 0.3     Recent Labs     03/28/25  0440 03/29/25  0350 03/30/25  0355   RBC 2.71* 2.51* 2.82*   HEMOGLOBIN 9.5* 9.3* 10.0*   HEMATOCRIT 28.6* 27.4* 30.1*   PLATELETCT 365 366 334       Imaging  X-Ray:  I have personally reviewed the images and compared with prior images.    Assessment/Plan  #LLL consolidation/Pneumonia  #Ventilator dependent respiratory failure  # Pulmonary edema 2/2 renal failure, less likely ARDS: P:F 75 (PaO2 75 on 100% FiO2), increased infiltrates, right sided infiltrates come and go, likely edema, anuric and has been fluid positive up until yesterday, cannot rule out pulmonary edema.   Plan:  - Continue  to ICU  - Daily SAT/SBT per protocol - Holding  - PRN ABG  - Elevate HOB to 30 degrees  - Chlorhexidine rinse to decrease VAP incidence  - Maintain O2 saturation > 90%   - Tube placement verified  - Pepcid for GI prophylaxis  - Sedation with propofol gtt for RASS -5 (hypertriglyceridemia with prop) - pending discussion with family, will potentially switch back to Versed given rapidly uptrending triglycerides on prop.   - Analgesia with Dilaudid (due to fent shortage) for CPOT < 2  - Restraints to prevent self extubation  - Utilizing lung protective ventilation with tidal volume 6ml/kg  - Monitoring peak and plateau pressures  - Abx coverage with Zosyn, Linezolid and Doxy  - Last day of IVIG  - Bronched 3/28, no purulence  - Stress dose steroids started 3/28    #Group A Strep  Plan:  - CT neck with contrast without abscess formation  - Continue Linezolid   - Unasyn -> Zosyn 3/28  - IVIG started 3/28  - ID consulting    #Parainfluenza Virus  Plan:  - Supportive  care    #Mycoplasma  Plan:  - Doxycycline for 7 days    #Diarrhea - likely 2/2 abx  Plan:  - BMS    #Septic Shock  #Sedation related shock  Plan:  - MAP goal > 65, SBP > 90  - Titrate Levophed to MAP goal   - Abx coverage as above     #Respiratory acidosis  Plan:  - Increased respiratory rate on ventilator  - continue to work towards vent synchrony      #Acute Anuric Kidney Failure  #Urosepsis  Plan:  - Monitor UOP  - Avoid nephrotoxins as able  - Monitor renal function on daily labs  - Consult nephrology  - HD today - Day 6   - CRRT if not negative today - Discussed with Dr. Lisa 3/29 - plan for prolonged HD today and if not negative will initiate CRRT tomorrow pending family discussions  - Likely will need ongoing HD after discharge     #Macrocytic Anemia  Plan:  - Monitor on daily labs  - Transfuse for hemoglobin < 7  - Folate and B12 levels WNL  - Outpatient workup     #Morbid obesity  Plan:  - no active plan while inpatient     #Hypomagnesemia  Plan:  - Monitor on labs  - Replete as needed    Family meeting today in the afternoon    VTE:  Heparin  Ulcer: H2 Antagonist  Lines: Thomas Catheter  Ongoing indication addressed    I have performed a physical exam and reviewed and updated ROS and Plan today (3/30/2025). In review of yesterday's note (3/29/2025), there are no changes except as documented above.     Discussed patient condition and risk of morbidity and/or mortality with RN, RT, and Pharmacy  The patient remains critically ill.  Critical care time = 120 minutes in directly providing and coordinating critical care and extensive data review.  No time overlap and excludes procedures.    Evi Calderon MD RD  Pulmonary and Critical Care    Available on Voalte

## 2025-03-30 NOTE — PROGRESS NOTES
ISOLATION PRECAUTIONS EDUCATION    Educated PATIENT, FAMILY, S.O: patient and family member on isolation for OTHER, parainfluenza 3, and mycoplasma pneumoniae    Educated on reason for isolation, how the infection may be transmitted, and how to help prevent transmission to others. Educated precautions involves staff and visitors wearing PPE, following Standard Precautions and performing meticulous hand hygiene in order to prevent transmission of infection.     Contact Precautions: Educated that Contact Precautions involves staff and visitors wearing gowns and gloves when in the patient room.     In addition, educated that the patient may leave the room, but prior to exiting the patient room each time, the patient needs to have on a fresh patient gown, ensure the potentially infectious area is covered, and perform hand hygiene with soap and water or alcohol-based hand rub, immediately prior to exiting the room.    Droplet Precautions: Educated that Droplet Precautions involves staff and visitors wearing PPE to include a surgical mask when in the patient room.     In addition, educated that they may leave their room, but prior to exiting the patient room each time, the patient needs to have on a fresh patient gown, a surgical mask must be worn by the patient while out of the patient room, and perform hand hygiene immediately prior to exiting the room.     Patient transport and mobilization on unit  Educated that they may leave their room, but prior to exiting, the patient needs to have on a fresh patient gown, ensure the potentially infectious area is covered, performing appropriate hand hygiene immediately prior to exiting the room.

## 2025-03-30 NOTE — PROGRESS NOTES
Infectious Disease Progress Note    Author: Lia Roblero M.D. Date & Time of service: 3/30/2025  9:56 AM    Chief Complaint:  Septic shock, pneumonia      Interval History:  3/29  AF, O2 Vent 14/70%, pressors weaning off, intubated on significant oxygen requirements, he has been afebrile today and some improvement in pressor requirements, started on high-dose steroids    Review of Systems:  Review of Systems   Unable to perform ROS: Intubated       Hemodynamics:  Temp (24hrs), Av.9 °C (98.5 °F), Min:36.1 °C (97 °F), Max:37.6 °C (99.7 °F)  Temperature: 36.1 °C (97 °F), Monitored Temp: 35.8 °C (96.4 °F)  Pulse  Av.5  Min: 56  Max: 110   Blood Pressure: 105/56       Physical Exam:  Physical Exam  Cardiovascular:      Rate and Rhythm: Normal rate.   Pulmonary:      Effort: Pulmonary effort is normal.   Abdominal:      General: Abdomen is flat.   Skin:     General: Skin is warm.   Neurological:      Comments: Intubated and sedated         Meds:    Current Facility-Administered Medications:     propofol    heparin    [COMPLETED] piperacillin-tazobactam **AND** piperacillin-tazobactam    heparin    MD ALERT...Pharmacist to Dose IVIG    immune globulin (Gamunex-C) 25 g in empty bag 250 mL IVIG    propofol **AND** Triglycerides Starting now and then Every 3 Days    hydrocortisone sodium succinate PF    NORepinephrine    famotidine **OR** famotidine    linezolid    Pharmacy    acetaminophen    midazolam    midazolam    NS    heparin    heparin    HYDROmorphone **OR** HYDROmorphone    HYDROmorphone    doxycycline    heparin    hydrALAZINE    Respiratory Therapy Consult    senna-docusate **AND** polyethylene glycol/lytes **AND** magnesium hydroxide **AND** bisacodyl    lidocaine    ipratropium-albuterol    Labs:  Recent Labs     25  0440 25  0350 25  0355   WBC 12.7* 15.4* 14.8*   RBC 2.71* 2.51* 2.82*   HEMOGLOBIN 9.5* 9.3* 10.0*   HEMATOCRIT 28.6* 27.4* 30.1*   .5* 109.2* 106.7*   MCH  35.1* 37.1* 35.5*   RDW 53.2* 55.4* 53.5*   PLATELETCT 365 366 334   MPV 9.6 10.0 10.0     Recent Labs     03/28/25  0440 03/29/25 0350 03/30/25 0355   SODIUM 135 133* 134*   POTASSIUM 3.5* 4.1 3.8   CHLORIDE 98 98 98   CO2 25 25 27   GLUCOSE 115* 119* 84   BUN 21 28* 37*     Recent Labs     03/28/25  0440 03/29/25 0350 03/30/25 0355   ALBUMIN 2.4* 2.4* 2.4*   TBILIRUBIN 0.6 0.4 0.3   ALKPHOSPHAT 83 86 64   TOTPROTEIN 6.3 7.2 7.5   ALTSGPT 30 30 28   ASTSGOT 38 51* 44   CREATININE 5.86* 5.57* 5.50*       Imaging:  DX-CHEST-PORTABLE (1 VIEW)  Result Date: 3/30/2025  3/30/2025 7:14 AM HISTORY/REASON FOR EXAM:  Shortness of Breath COMPARISON: 3/28/2025. TECHNIQUE/EXAM DESCRIPTION AND NUMBER OF VIEWS: Single portable view of the chest. FINDINGS: Vascular congestion and mild infiltrates remain. Small left effusion suggested. Improving mild basilar atelectasis. Shallow breath. ET tube 3.5 cm above johny. NG tube in stomach.     1. Shallow breath. Improving mild left base atelectasis. 2. Vascular congestion remains. 3. ET tube 3.5 cm above johny. NG tube in stomach.     DX-CHEST-PORTABLE (1 VIEW)  Result Date: 3/28/2025  3/28/2025 7:30 AM HISTORY/REASON FOR EXAM:  Shortness of Breath. TECHNIQUE/EXAM DESCRIPTION AND NUMBER OF VIEWS: Single portable view of the chest. COMPARISON: 3/27/2025 FINDINGS: Increased right lung hazy opacification. Stable left lung hazy opacification. Body habitus limits the exam. Cardiac silhouette remains normal size. Endotracheal tube appears adequate. Nasogastric tube has been removed.     1. Limited exam second to body habitus. 2. Increased right lung airspace disease and pleural fluid. 2. Stable left-sided airspace disease and pleural fluid.    DX-CHEST-PORTABLE (1 VIEW)  Result Date: 3/27/2025  3/27/2025 8:23 PM HISTORY/REASON FOR EXAM: Shortness of breath TECHNIQUE/EXAM DESCRIPTION AND NUMBER OF VIEWS: Single portable view of the chest. COMPARISON: 3/27/2025 FINDINGS: There are multiple  supportive devices again seen, stable. There are bilateral interstitial infiltrates, left greater than right. There is small bilateral pleural effusion. The heart is normal in size.     Again seen bilateral interstitial infiltrates, left greater than right.    EC-ECHOCARDIOGRAM COMPLETE W/ CONT  Result Date: 3/27/2025  Transthoracic Echo Report Echocardiography Laboratory CONCLUSIONS No prior study is available for comparison. Technically difficult but adequate study for interpretation due to poor acoustic windows. Normal left ventricular systolic function. The left ventricular ejection fraction is visually estimated to be 60- 65%. Grossly normal right ventricular size and systolic function. ANNETTE IBARRA Exam Date:         2025                    15:07 Exam Location:     Out Patient Priority:          Routine Ordering Physician:        SAADIA MORFIN Referring Physician: Sonographer:               Viet Spears Age:    19     Gender:    M MRN:    [id number] :    2005 BSA:    2.15   Ht (in):    62     Wt (lb):    263 Exam Type:     Complete Indications:     Heart Failure, Systolic ICD Codes:       428.2 CPT Codes:       46107,  BP:   108    /   52     HR:   83 Technical Quality:       Technically difficult study -                          adequate information is obtained MEASUREMENTS  (Male / Female) Normal Values 2D ECHO LV Diastolic Diameter PLAX        4 cm                  4.2 - 5.9 / 3.9 - 5.3 cm LV Systolic Diameter PLAX         2.1 cm                2.1 - 4.0 cm IVS Diastolic Thickness           0.97 cm               LVPW Diastolic Thickness          1 cm                  LVOT Diameter                     2.1 cm                LV Ejection Fraction MOD BP       68.1 %                >= 55  % LV Ejection Fraction MOD 4C       67.9 %                LV Ejection Fraction MOD 2C       70.4 %                IVC Diameter                      2 cm                  DOPPLER AV Peak Velocity                   1.7 m/s               AV Peak Gradient                  11.1 mmHg             AV Mean Gradient                  5.2 mmHg              Mitral E Point Velocity           0.95 m/s              Mitral E to A Ratio               1.7                   MV Pressure Half Time             51 ms                 MV Area PHT                       4.3 cm2               MV Deceleration Time              176 ms                PV Peak Velocity                  1.5 m/s               PV Peak Gradient                  9.6 mmHg              PV Mean Gradient                  5.2 mmHg              RVOT Peak Velocity                1.4 m/s               * Indicates values subject to auto-interpretation LV EF:        % FINDINGS Left Ventricle Normal left ventricular chamber size. Normal left ventricular wall thickness. Normal left ventricular systolic function. The left ventricular ejection fraction is visually estimated to be 60-65%. Normal regional wall motion. Normal diastolic function. 3mL of contrast was used to enhance visualization of the endocardial border. Existing IV was used at the right AC. Right Ventricle Grossly normal right ventricular size and systolic function. Right Atrium The right atrium is not well visualized. Patient on a ventilator. Left Atrium The left atrium is not well visualized. Mitral Valve Structurally normal mitral valve. No mitral stenosis. Trace mitral regurgitation. Aortic Valve The aortic valve is not well visualized. No aortic valve stenosis. Trace aortic insufficiency. Tricuspid Valve The tricuspid valve is not well visualized. No tricuspid stenosis. Trace tricuspid regurgitation. Unable to estimate pulmonary artery pressure due to an inadequate tricuspid regurgitant jet. Pulmonic Valve The pulmonic valve is not well visualized. No pulmonic stenosis. Trace pulmonic insufficiency. Pericardium No pericardial effusion. Aorta Normal aortic root for body surface area. The ascending aorta diameter is  2.1 cm. Nomi Jones MD (Electronically Signed) Final Date:     27 March 2025                 17:38    DX-CHEST-PORTABLE (1 VIEW)  Result Date: 3/27/2025  3/27/2025 1:59 PM HISTORY/REASON FOR EXAM: . Respiratory failure TECHNIQUE/EXAM DESCRIPTION AND NUMBER OF VIEWS: Single portable view of the chest. COMPARISON: 3/26/2025 FINDINGS: Endotracheal tube and gastric tube are again noted. The cardiomediastinal silhouette is stable. There is diffuse interstitial infiltrate with small bilateral pleural effusions. The appearance is suggestive of pulmonary edema. Underlying infection cannot be excluded. When compared with the prior exam, there is increased opacity at the lung bases. No pneumothorax is evident.     Persistent bilateral infiltrates and small effusions with increased opacity at the lung bases.    DX-CHEST-PORTABLE (1 VIEW)  Result Date: 3/26/2025  3/26/2025 4:46 AM HISTORY/REASON FOR EXAM:  For indication of respiratory failure. TECHNIQUE/EXAM DESCRIPTION AND NUMBER OF VIEWS: Single portable view of the chest. COMPARISON: 3/25/2025 FINDINGS: Stable large amount of bilateral airspace disease, greatest on the left. Cardiac silhouette is normal size. Endotracheal tube and nasogastric tube appear adequate.     Stable large amount of bilateral airspace disease, greatest on the left.    CT-SOFT TISSUE NECK WITH  Result Date: 3/25/2025  3/25/2025 9:27 AM HISTORY/REASON FOR EXAM:  Group A Strep. TECHNIQUE/EXAM DESCRIPTION AND NUMBER OF VIEWS:  CT soft tissue neck with contrast. The study was performed on a helical multidetector CT scanner. Contiguous thin section helical images were obtained of the neck from the skull base through the thoracic inlet. 100 mL of Omnipaque 350 nonionic contrast was injected intravenously. Low dose optimization technique was utilized for this CT exam including automated exposure control and adjustment of the mA and/or kV according to patient size. COMPARISON: None. FINDINGS: Examination  limited by artifact. Endotracheal tube terminates above the johny. Enteric tube projects over the stomach. BRAIN: Visualized portions of the brain are normal in appearance. TEMPORAL bones: The mastoids and middle ears are opacified. PARANASAL SINUSES: Paranasal sinuses are partially opacified. CERVICAL spine: There is no significant cervical spine abnormality. NODES: Bilateral enlarged cervical lymph nodes. 1.6 cm short axis and 1.0 cm short axis left level 2 lymph nodes and 1.1 cm short axis left level 3 lymph node. 1.3 cm short axis right level 2 and 0.9 cm short axis right level 3 lymph node. SALIVARY and THYROID gland: The parotid, submandibular, and thyroid gland are normal in appearance. AIRWAY: Upper airway partially obscured by ET tube and enteric tube. No definite peritonsillar abscess identified. MEDIASTINUM: Multiple enhancing lymph nodes within the superior mediastinum measuring up to 0.9 cm short axis. LUNGS: Right upper lobe patchy parenchymal opacities in left upper lobe consolidation. Small/moderate left pleural effusion.     1.  Examination limited by artifact. 2.  Bilateral cervical adenopathy. Superior mediastinal adenopathy. 3.  Upper airway is partially obscured by ET tube and enteric tube. No definite peritonsillar abscess identified. 4.  Opacified mastoids and middle ears. Inflammatory changes not excluded. 5.  Partially opacified paranasal sinuses. After changes not excluded. 6.  Patchy parenchymal opacities right upper lobe and consolidation left upper lobe.    CT-ABDOMEN-PELVIS W/O  Result Date: 3/25/2025  3/25/2025 9:27 AM HISTORY/REASON FOR EXAM:  Sepsis of unknown origin. TECHNIQUE/EXAM DESCRIPTION: CT scan of the abdomen and pelvis without contrast. Noncontrast helical scanning was obtained from the diaphragmatic domes through the pubic symphysis. Low dose optimization technique was utilized for this CT exam including automated exposure control and adjustment of the mA and/or kV  according to patient size. COMPARISON: None. FINDINGS: Examination limited by artifact. Lower Chest: Consolidation within both lung bases and pleural effusions.. Liver: Diffuse low-attenuation liver consistent with fatty change.. Spleen: Spleen is within normal limits in size.. Pancreas: No pancreatic mass identified.. Gallbladder: No calcified stones. Biliary: Nondilated. Adrenal glands: No adrenal gland mass identified.. Kidneys: No hydronephrosis or urolithiasis. Bowel: No evidence of bowel obstruction or acute appendicitis.. Lymph nodes: Numerous small retroperitoneal lymph nodes. Numerous subcentimeter mesenteric lymph nodes.. Vasculature: Unremarkable. Peritoneum: Unremarkable without ascites. Pelvis: Catheter extends into a decompressed urinary bladder. Right femoral line projects into the right common and iliac vein. 10.4 mm short axis left inguinal lymph node and and 9.7 mm short axis right inguinal lymph node. Musculoskeletal: No acute or destructive process. No pelvic free fluid     1.  Examination limited by artifact. 2.  No evidence of bowel obstruction or acute appendicitis. No free fluid 3.   Hepatic steatosis. 4.  Bilateral lung base consolidation and pleural effusions.    CT-CTA CHEST PULMONARY ARTERY W/ RECONS  Result Date: 3/25/2025  3/25/2025 9:27 AM HISTORY/REASON FOR EXAM:  Respiratory Failure, possible PE TECHNIQUE/EXAM DESCRIPTION: CT angiogram scan for pulmonary embolism with contrast, with reconstructions. 1.25 mm helical sections were obtained from the lung apices through the lung bases following the rapid bolus administration of 100 mL of Omnipaque 350 nonionic contrast. Thin-section overlapping reconstruction interval was utilized. Coronal reconstructions were generated from the axial data. MIP post processing was performed and utilized for the interpretation. Low dose optimization technique was utilized for this CT exam including automated exposure control and adjustment of the mA  and/or kV according to patient size. COMPARISON: None FINDINGS: Examination limited by artifact. Pulmonary Embolism: No. Main Pulmonary Arteries: No. Segmental branches: No. Subsegmental branches: Obscured by artifact. Additional Comments: Cardiomegaly. No fluid collections. The aorta and branch vessels faintly opacified.. No aortic aneurysm identified. Lungs: Airspace opacities right lower lobe. Patchy opacities posterior right upper lobe. Airspace opacities left upper lobe including the lingula and left lower lobe.. The left lower lobe is almost completely opacified. Pleura: Small right pleural effusion. Small/moderate left pleural effusion.. Nodes: Limited evaluation.. Additional findings: Endotracheal tube terminates above the johny. Enteric tube projects over the stomach..     1.  Examination limited by artifact. 2.  No proximal pulmonary artery emboli identified. 3.  Bilateral airspace opacities consistent with atelectasis/consolidative pneumonia. 4.  Small right and small/moderate left pleural effusions.     DX-CHEST-PORTABLE (1 VIEW)  Result Date: 3/25/2025  3/25/2025 5:31 AM HISTORY/REASON FOR EXAM:  For indication of respiratory failure. TECHNIQUE/EXAM DESCRIPTION AND NUMBER OF VIEWS: Single portable view of the chest. COMPARISON: 3/24/2025 FINDINGS: Endotracheal tube and nasogastric tube appear adequate. Increased bilateral airspace disease, greatest on the left. Body habitus limits evaluation. Cardiac silhouette is stable.     Increasing bilateral airspace disease, greatest on the left.    EE-ITGGVIO-6 VIEW  Result Date: 3/24/2025  3/24/2025 2:27 PM HISTORY/REASON FOR EXAM: NG tube placement TECHNIQUE/EXAM DESCRIPTION AND NUMBER OF VIEWS: 1 supine views of the abdomen. COMPARISON: None FINDINGS: There is no evidence of bowel obstruction. NG tube tip overlies the gastric body. No abnormal calcifications are seen.     NG tube tip overlies the gastric body.    DX-CHEST-PORTABLE (1 VIEW)  Result Date:  3/24/2025  3/24/2025 2:27 PM HISTORY/REASON FOR EXAM: Post intubation TECHNIQUE/EXAM DESCRIPTION AND NUMBER OF VIEWS: Single portable view of the chest. COMPARISON: 3/24/2025 from another institution. FINDINGS: There is an endotracheal tube with tip located above the level of the clavicles. NG tube extends into the stomach. There is interval improvement in aeration of the left lung. There remains consolidation and volume loss. There is no pleural effusion. The heart is enlarged.     1.  Interval improvement in aeration of the left lung. There does remain volume loss and consolidation. 2.  Cardiomegaly. 3.  Endotracheal tube tip located just above the level of the clavicles.      Micro:  Results       Procedure Component Value Units Date/Time    BLOOD CULTURE [[id number]] Collected: 03/24/25 1700    Order Status: Completed Specimen: Blood from Peripheral Updated: 03/29/25 1817     Significant Indicator NEG     Source BLD     Site PERIPHERAL     Culture Result No growth after 5 days of incubation.  Blood culture testing and Gram stain, if indicated, are  performed at [facility], [address] [place] [address].  Positive blood cultures are  sent to [facility], [address] [place] [address], for organism identification and  susceptibility testing.      BLOOD CULTURE [[id number]] Collected: 03/24/25 1700    Order Status: Completed Specimen: Blood from Peripheral Updated: 03/29/25 1817     Significant Indicator NEG     Source BLD     Site PERIPHERAL     Culture Result No growth after 5 days of incubation.  Blood culture testing and Gram stain, if indicated, are  performed at [facility], [facility]  [facility].[place], [place].  Positive blood cultures are  sent to [facility], [address] [place] [address], for organism identification and  susceptibility testing.      BLOOD CULTURE [[id number]] Collected: 03/28/25 1100    Order  Status: Completed Specimen: Blood from Peripheral Updated: 03/28/25 1419     Significant Indicator NEG     Source BLD     Site PERIPHERAL     Culture Result No Growth  Note: Blood cultures are incubated for 5 days and  are monitored continuously.Positive blood cultures  are called to the RN and reported as soon as  they are identified.  Blood culture testing and Gram stain, if indicated, are  performed at [facility],  [address] [place] [address].  Positive blood  cultures are sent to [facility],  [address] [place] [address], for organism identification  and susceptibility testing.      BLOOD CULTURE [[id number]] Collected: 03/28/25 1137    Order Status: Completed Specimen: Blood from Peripheral Updated: 03/28/25 1419     Significant Indicator NEG     Source BLD     Site PERIPHERAL     Culture Result No Growth  Note: Blood cultures are incubated for 5 days and  are monitored continuously.Positive blood cultures  are called to the RN and reported as soon as  they are identified.  Blood culture testing and Gram stain, if indicated, are  performed at [facility],  [address] [place] [address].  Positive blood  cultures are sent to [facility],  [address] [place] [address], for organism identification  and susceptibility testing.      GRAM STAIN [[id number]] Collected: 03/24/25 1745    Order Status: Completed Specimen: Respirate Updated: 03/27/25 2212     Significant Indicator .     Source RESP     Site BAL     Gram Stain Result Many WBCs.  No organisms seen.      Fungal Smear [[id number]] Collected: 03/24/25 1745    Order Status: Completed Specimen: Respirate Updated: 03/27/25 2212     Significant Indicator NEG     Source RESP     Site BAL     Fungal Smear Results No fungal elements seen.    Fungal Culture [[id number]] Collected: 03/24/25 1745    Order Status: Completed Specimen: Respirate Updated: 03/27/25 2211      Significant Indicator NEG     Source RESP     Site BAL     Culture Result Culture in progress.     Fungal Smear Results No fungal elements seen.    CULTURE RESPIRATORY W/ GRM STN [[id number]] Collected: 03/24/25 1745    Order Status: Completed Specimen: Respirate Updated: 03/27/25 2211     Significant Indicator NEG     Source RESP     Site BAL     Culture Result No growth at 48 hours.     Gram Stain Result Many WBCs.  No organisms seen.      AFB CULTURE AND STAIN (ACID-FAST BACILLUS) [[id number]] Collected: 03/24/25 1745    Order Status: Completed Updated: 03/27/25 1723     AFB Smear Results SEE NOTE     Comment: Negative for Acid Fast Bacteria.  Less than 5 mL of specimen received.  A minimum of 5 mL of sputum or fluid is recommended  for recovery of acid fast bacilli (AFB).  Volumes less than 5 mL are suboptimal and may  compromise recovery of AFB from culture.          Preliminary Report SEE NOTE     Comment: Specimen received and in progress.  Positive culture results are reported as soon as detected.  Final report to follow in seven to eight weeks  Performed By: [facility]  [address] [place] [id number]  : Yevgeniy Shafer MD, PhD  CLIA Number: [id number]         Respiratory Panel by PCR (Inpatient ONLY) [[id number]]  (Abnormal) Collected: 03/25/25 0647    Order Status: Completed Specimen: Respirate from Nasopharyngeal Updated: 03/25/25 1419     Adenovirus, PCR Not Detected     SARS-CoV-2 (COVID-19) RNA by DEVI NotDetected     Comment: [facility] providers: PLEASE REFER TO DE-ESCALATION AND RETESTING PROTOCOL  on [email or web address]    **The [employer] Respiratory Panel 2.1 (RP2.1) RT-PCR test is FDA authorized.          Coronavirus 229E, PCR Not Detected     Coronavirus HKU1, PCR Not Detected     Coronavirus NL63, PCR Not Detected     Coronavirus OC43, PCR Not Detected     Human Metapneumovirus, PCR Not Detected     Rhino/Enterovirus, PCR Not Detected     Influenza A, PCR Not  Detected     Influenza B, PCR Not Detected     Parainfluenza 1, PCR Not Detected     Parainfluenza 2, PCR Not Detected     Parainfluenza 3, PCR DETECTED     Parainfluenza 4, PCR Not Detected     RSV (Respiratory Syncytial Virus), PCR Not Detected     Bordetella parapertussis (ZO6402), PCR Not Detected     Bordetella pertussis (ptxP), PCR Not Detected     Mycoplasma pneumoniae, PCR DETECTED     Chlamydia pneumoniae, PCR Not Detected    Group A Strep by PCR [[id number]]  (Abnormal) Collected: 03/25/25 0739    Order Status: Completed Specimen: Throat Updated: 03/25/25 0820     Group A Strep by PCR DETECTED    URINALYSIS [[id number]]  (Abnormal) Collected: 03/25/25 0705    Order Status: Completed Specimen: Urine, Thomas Cath Updated: 03/25/25 0805     Color Dark Yellow     Character Turbid     Specific Gravity 1.024     Ph 5.0     Glucose 100 mg/dL      Ketones Trace mg/dL      Protein 30 mg/dL      Bilirubin Small     Urobilinogen, Urine 1.0 EU/dL      Nitrite Negative     Leukocyte Esterase Moderate     Occult Blood Large     Micro Urine Req Microscopic    MRSA By PCR (Amp) [[id number]] Collected: 03/24/25 1700    Order Status: Completed Specimen: Respirate from Nares Updated: 03/24/25 2211     MRSA by PCR Negative    CULTURE RESPIRATORY W/ GRM STN [[id number]] Collected: 03/24/25 1700    Order Status: Canceled Specimen: Other from Bronchoalveolar Lavage     AFB Culture [[id number]] Collected: 03/24/25 1700    Order Status: Sent Specimen: Respirate from Bronchoalveolar Lavage     Fungal Culture [[id number]] Collected: 03/24/25 1700    Order Status: Canceled Specimen: Other from Bronchoalveolar Lavage             Assessment:  Active Hospital Problems    Diagnosis     *Respiratory failure (HCC) [J96.90]      Interval 24 hours:      AF, O2 Vent 16/90%, pressors off  Labs reviewed  [facility] personally reviewed both images and report.  Studies reviewed  Micro reviewed  Notes from primary team and specialists reviewed    Pt  continues to require high vent support, remains anuric and on dialysis.    Antibiotics as below.       ASSESSMENT/PLAN:      19 y.o.  admitted 3/24/2025. Pt has a past medical history of Down syndrome and obesity.  He came to the ER with complaints of shortness of breath and was intubated there.  Per report he had had cough and fever prior to coming in as well as diarrhea and nausea.     Hospital Course:   Patient remains intubated on high flow oxygen in the ICU, and has required pressor support, ongoing low-grade fevers and leukocytosis.  Respiratory viral panel positive for mycoplasma pneumonia and parainfluenza.   Group A strep also positive.     Problem List     Septic shock, improved   Fevers, improved   VDRF  Pneumonia  -Respiratory PCR +group A strep, mycoplasma pneumoniae and parainfluenza 3  Concern for streptococcal toxic shock syndrome given symptoms prior to admit, ongoing fevers, transaminitis on arrival, kidney failure and concern for ARDS  YUKO, anuric, continues to require CRRT  Down syndrome  Obesity     Plan:     --- Continue renally dosed Zosyn for now  --- Continue p.o. linezolid  --- Continue IV doxycycline, will give 14-day course  --- Recommend trial IVIG x 3 days, discussed with pharmacy, started on 3/28  --- F/up blood cultures  --- Follow-up BAL cultures, no growth to date  --- Monitor kidney function, renally dose medications  --- If patient transitions to comfort care recommend stopping all antibiotics, goals of care discussions underway.      Dispo: Awaiting culture results and work-up as above.  Patient is at risk for infectious complications including death.     PICC: TBD        Plan of care discussed with HANNAH Calderon M.D.. Will continue to follow peripherally.     Lia Roblero M.D.

## 2025-03-30 NOTE — CARE PLAN
Pt. Is on CMV 24, vt 330 cc, peep 16, Fio2 90%      Problem: Ventilation  Goal: Ability to achieve and maintain unassisted ventilation or tolerate decreased levels of ventilator support  Description: Target End Date:  4 days Document on Vent flowsheet1.  Support and monitor invasive and noninvasive mechanical ventilation2.  Monitor ventilator weaning response3.  Perform ventilator associated pneumonia prevention interventions4.  Manage ventilation therapy by monitoring diagnostic test results  Outcome: Not Met

## 2025-03-30 NOTE — PROGRESS NOTES
12-hour chart check complete.    Monitor Summary  Rhythm: SR/SB BBB  Rate: 50's-60's  Ectopy: rare PVC  Measurements: 0.14/0.12/0.38

## 2025-03-30 NOTE — CARE PLAN
The patient is Watcher - Medium risk of patient condition declining or worsening    Shift Goals  Clinical Goals: RASS -5  Patient Goals: MIREILLE  Family Goals: MIREILLE      Problem: Pain - Standard  Goal: Alleviation of pain or a reduction in pain to the patient’s comfort goal  Description: Target End Date:  Prior to discharge or change in level of careDocument on Vitals flowsheet1.  Document pain using the appropriate pain scale per order or unit policy2.  Educate and implement non-pharmacologic comfort measures (i.e. relaxation, distraction, massage, cold/heat therapy, etc.)3.  Pain management medications as ordered4.  Reassess pain after pain med administration per policy5.  If opiods administered assess patient's response to pain medication is appropriate per POSS sedation scale6.  Follow pain management plan developed in collaboration with patient and interdisciplinary team (including palliative care or pain specialists if applicable)  Outcome: Progressing     Problem: Safety - Medical Restraint  Goal: Remains free of injury from restraints (Restraint for Interference with Medical Device)  Description: INTERVENTIONS:1. Determine that other, less restrictive measures have been tried or would not be effective before applying the restraint2. Evaluate the patient's condition at the time of restraint application3. Educate patient/family regarding the reason for restraint4. Q2H: Monitor safety, psychosocial status, comfort, circulation, respiratory status, LOC, nutrition and hydration  Outcome: Progressing  Goal: Free from restraint(s) (Restraint for Interference with Medical Device)  Description: INTERVENTIONS:1.  ONCE/SHIFT or MINIMUM Q12H: Assess and document the continuing need for restraints2.  Q24H: Continued use of restraint requires LIP to perform face to face examination and written order3.  Identify and implement measures to help patient regain control4.  Educate patient/family on discontinuation criteria 5.   Assess patient's understanding and retention of education provided6.  Assess readiness for release & initiate progressive release per protocol7.  Identify and document criteria for restraints  Outcome: Progressing

## 2025-03-30 NOTE — PROCEDURES
Nephrology/Hemodialysis note    Patient admitted with PNA, shock, YUKO - initiated RRT  Seen and examined during dialysis  Intubated on vent  No improvement in UOP, general condition  Still with high FiO2 requirements  Lab results reviewed   Tolerates dialysis well   UF 3-4 L  Please see dialysis flow sheet for details

## 2025-03-30 NOTE — RESPIRATORY CARE
Adult Ventilation Update    Events/Summary/Plan: pt prone @ 0420 cont as tolerated throughout the night .

## 2025-03-31 NOTE — PROGRESS NOTES
12-hour chart check complete.    Monitor Summary  Rhythm: SB   Rate: 58  Ectopy: PVC  Measurements: 0.18/0.102/0.504

## 2025-03-31 NOTE — CARE PLAN
The patient is Unstable - High likelihood or risk of patient condition declining or worsening    Shift Goals  Clinical Goals: RASS -5, Stable vitals  Patient Goals: MIREILLE  Family Goals: Comfort    Progress made toward(s) clinical / shift goals:  Pt sedated to RASS -5 per order, Vitals stable but continues to need levophed gtt and vent at 16 PEEP with 100% FiO2.  Family at bedside saying goodbyes as plan is to transition to comfort care.  Will continue with plan of care.        Problem: Pain - Standard  Goal: Alleviation of pain or a reduction in pain to the patient’s comfort goal  Outcome: Progressing     Problem: Safety - Medical Restraint  Goal: Remains free of injury from restraints (Restraint for Interference with Medical Device)  Outcome: Progressing  Goal: Free from restraint(s) (Restraint for Interference with Medical Device)  Outcome: Progressing     Problem: Skin Integrity  Goal: Skin integrity is maintained or improved  Outcome: Progressing     Problem: Fall Risk  Goal: Patient will remain free from falls  Outcome: Progressing       Patient is not progressing towards the following goals:      Problem: Knowledge Deficit - Standard  Goal: Patient and family/care givers will demonstrate understanding of plan of care, disease process/condition, diagnostic tests and medications  Outcome: Not Progressing

## 2025-03-31 NOTE — CARE PLAN
Problem: Ventilation  Goal: Ability to achieve and maintain unassisted ventilation or tolerate decreased levels of ventilator support  Description: Target End Date:  4 days Document on Vent flowsheet1.  Support and monitor invasive and noninvasive mechanical ventilation2.  Monitor ventilator weaning response3.  Perform ventilator associated pneumonia prevention interventions4.  Manage ventilation therapy by monitoring diagnostic test results  Note:   Ventilator Daily Summary    Vent Day #6  Airway: 7.5@23    Ventilator settings: 24/330/16/100  Weaning trials: none  Respiratory Procedures: none    Plan: Continue current ventilator settings and wean mechanical ventilation as tolerated per physician orders.

## 2025-03-31 NOTE — DISCHARGE SUMMARY
Death Summary    Cause of Death  Cardiopulmonary arrest due to acute hypoxic respiratory failure due to pneumonia due to Group A Streptococcus, parainfluenza 4 and mycoplasma pneumoniae.    Comorbid Conditions at the Time of Death  Principal Problem:    Respiratory failure (HCC) (POA: Yes)  Resolved Problems:    * No resolved hospital problems. *    History of Presenting Illness and Hospital Course  Katey is a 19 year old male with down syndrome and obesity admitted with respiratory complaints and hypoxia who was intubated in the emergency room. His mom states that lots of kids at his school were ill with high fevers over the last week and he became ill 5 days prior to hospital presentation. He was initially coughing and had a fever but then became hypoxic to the 70's-80's for a couple days prior to bringing him in. He had some diarrhea and upset stomach for a couple days as well. He was uncooperative with NC oxygen and medical treatment. He was intubated for acute hypoxic respiratory failure and did not improve after 8 days on the ventilator. He also had anuric/oliguric renal failure secondary to hypoxic vs. Septic ATN. His family ultimately chose for him to be put on comfort care after a goals of care meeting with Dr. Calderon 3/30/25. He was transitioned to comfort care on morning of 3/31/25 and was palliatively extubated surrounded by family at bedside. Patient was managed with a hydromorphone drip, hydromorphone pushes and midazolam pushes to treat his air hunger, anxiety and pain at the end of his life.     Total discharge time including coordination of care and counseling at bedside = 45 minutes    Death Date: 03/31/25   Death Time: 1059    Pronounced By (RN1): Donna Jones  Pronounced By (RN2): Nithin Fallon MD  Pulmonary and Critical Care Medicine  [facility]

## 2025-04-02 LAB
BACTERIA BLD CULT: NORMAL
BACTERIA BLD CULT: NORMAL
SIGNIFICANT IND 70042: NORMAL
SIGNIFICANT IND 70042: NORMAL
SITE SITE: NORMAL
SITE SITE: NORMAL
SOURCE SOURCE: NORMAL
SOURCE SOURCE: NORMAL

## 2025-04-24 LAB
FUNGUS SPEC CULT: NORMAL
FUNGUS SPEC FUNGUS STN: NORMAL
SIGNIFICANT IND 70042: NORMAL
SITE SITE: NORMAL
SOURCE SOURCE: NORMAL

## 2025-05-21 LAB
FINAL REPORT Q0603: NORMAL
PRELIMINARY RPT Q0601: NORMAL
RHODAMINE-AURAMINE STN SPEC: NORMAL